# Patient Record
Sex: FEMALE | Race: WHITE | Employment: OTHER | URBAN - METROPOLITAN AREA
[De-identification: names, ages, dates, MRNs, and addresses within clinical notes are randomized per-mention and may not be internally consistent; named-entity substitution may affect disease eponyms.]

---

## 2017-02-07 ENCOUNTER — HOSPITAL ENCOUNTER (OUTPATIENT)
Dept: GENERAL RADIOLOGY | Age: 72
Discharge: HOME | End: 2017-02-07
Attending: INTERNAL MEDICINE | Admitting: INTERNAL MEDICINE

## 2017-02-07 LAB
ALBUMIN SERPL-MCNC: 4.1 GM/DL (ref 3.4–5)
ANION GAP SERPL CALCULATED.3IONS-SCNC: 15 MMOL/L (ref 4–16)
BUN BLDV-MCNC: 28 MG/DL (ref 6–23)
CALCIUM SERPL-MCNC: 9.4 MG/DL (ref 8.3–10.6)
CHLORIDE BLD-SCNC: 101 MMOL/L (ref 99–110)
CO2: 24 MMOL/L (ref 21–32)
CREAT SERPL-MCNC: 1 MG/DL (ref 0.6–1.1)
GFR AFRICAN AMERICAN: >60 ML/MIN/1.73M2
GFR NON-AFRICAN AMERICAN: 55 ML/MIN/1.73M2
GLUCOSE BLD-MCNC: 70 MG/DL (ref 70–140)
Lab: 24 HRS
MAGNESIUM: 1.5 MG/DL (ref 1.8–2.4)
PHOSPHORUS: 3.5 MG/DL (ref 2.5–4.9)
POTASSIUM SERPL-SCNC: 4.2 MMOL/L (ref 3.5–5.1)
PROTEIN 24 HOUR URINE: 92 MG/24 HR
SODIUM BLD-SCNC: 140 MMOL/L (ref 135–145)
VOLUME, (UVOL): 1050 MLS

## 2017-02-09 ENCOUNTER — HOSPITAL ENCOUNTER (OUTPATIENT)
Dept: ULTRASOUND IMAGING | Age: 72
Discharge: HOME | End: 2017-02-09
Attending: OBSTETRICS & GYNECOLOGY | Admitting: OBSTETRICS & GYNECOLOGY

## 2017-02-09 DIAGNOSIS — Z09 FOLLOW-UP EXAM: ICD-10-CM

## 2017-02-09 DIAGNOSIS — R92.8 ABNORMAL MAMMOGRAM: ICD-10-CM

## 2017-05-24 ENCOUNTER — HOSPITAL ENCOUNTER (OUTPATIENT)
Dept: GENERAL RADIOLOGY | Age: 72
Discharge: HOME | End: 2017-05-24
Attending: INTERNAL MEDICINE | Admitting: INTERNAL MEDICINE

## 2017-05-24 DIAGNOSIS — M54.5 LOW BACK PAIN, UNSPECIFIED BACK PAIN LATERALITY, UNSPECIFIED CHRONICITY, WITH SCIATICA PRESENCE UNSPECIFIED: ICD-10-CM

## 2017-08-16 ENCOUNTER — HOSPITAL ENCOUNTER (OUTPATIENT)
Dept: MRI IMAGING | Age: 72
Discharge: HOME | End: 2017-08-16
Attending: INTERNAL MEDICINE | Admitting: INTERNAL MEDICINE

## 2017-08-16 DIAGNOSIS — M54.17 LUMBOSACRAL RADICULOPATHY: ICD-10-CM

## 2017-08-16 DIAGNOSIS — M54.17 RADICULOPATHY OF LUMBOSACRAL REGION: ICD-10-CM

## 2018-09-15 ENCOUNTER — HOSPITAL ENCOUNTER (OUTPATIENT)
Dept: MRI IMAGING | Age: 73
Discharge: HOME | End: 2018-09-15
Attending: INTERNAL MEDICINE | Admitting: INTERNAL MEDICINE

## 2018-09-15 DIAGNOSIS — M54.12 RADICULOPATHY, CERVICAL: ICD-10-CM

## 2018-09-19 ENCOUNTER — HOSPITAL ENCOUNTER (OUTPATIENT)
Dept: ULTRASOUND IMAGING | Age: 73
Discharge: HOME | End: 2018-09-19
Attending: INTERNAL MEDICINE | Admitting: INTERNAL MEDICINE

## 2018-09-19 DIAGNOSIS — E04.1 THYROID NODULE: ICD-10-CM

## 2018-09-19 DIAGNOSIS — M54.2 CERVICALGIA: ICD-10-CM

## 2018-09-28 ENCOUNTER — HOSPITAL ENCOUNTER (OUTPATIENT)
Dept: GENERAL RADIOLOGY | Age: 73
Discharge: HOME | End: 2018-09-28
Payer: MEDICARE

## 2018-09-28 ENCOUNTER — HOSPITAL ENCOUNTER (OUTPATIENT)
Age: 73
Discharge: HOME | End: 2018-09-28
Payer: MEDICARE

## 2018-09-28 DIAGNOSIS — Z01.818 PRE-PROCEDURAL EXAMINATION: ICD-10-CM

## 2018-09-28 PROCEDURE — 71046 X-RAY EXAM CHEST 2 VIEWS: CPT

## 2018-12-06 PROBLEM — I65.23 BILATERAL CAROTID ARTERY STENOSIS: Status: ACTIVE | Noted: 2018-12-06

## 2018-12-17 ENCOUNTER — HOSPITAL ENCOUNTER (OUTPATIENT)
Dept: GENERAL RADIOLOGY | Age: 73
Discharge: HOME | End: 2018-12-17
Payer: MEDICARE

## 2018-12-17 ENCOUNTER — HOSPITAL ENCOUNTER (OUTPATIENT)
Dept: PREADMISSION TESTING | Age: 73
Discharge: HOME | End: 2018-12-21
Payer: MEDICARE

## 2018-12-17 VITALS
DIASTOLIC BLOOD PRESSURE: 67 MMHG | HEART RATE: 61 BPM | TEMPERATURE: 97.1 F | SYSTOLIC BLOOD PRESSURE: 144 MMHG | WEIGHT: 192 LBS | HEIGHT: 61 IN | RESPIRATION RATE: 16 BRPM | BODY MASS INDEX: 36.25 KG/M2 | OXYGEN SATURATION: 97 %

## 2018-12-17 DIAGNOSIS — Z41.9 ELECTIVE SURGERY: ICD-10-CM

## 2018-12-17 DIAGNOSIS — Z41.9 ELECTIVE SURGERY: Primary | ICD-10-CM

## 2018-12-17 LAB
ANION GAP SERPL CALCULATED.3IONS-SCNC: 15 MMOL/L (ref 4–16)
APTT: 35.2 SECONDS (ref 21.2–33)
BUN BLDV-MCNC: 23 MG/DL (ref 6–23)
CALCIUM SERPL-MCNC: 9.4 MG/DL (ref 8.3–10.6)
CHLORIDE BLD-SCNC: 104 MMOL/L (ref 99–110)
CO2: 19 MMOL/L (ref 21–32)
CREAT SERPL-MCNC: 1.5 MG/DL (ref 0.6–1.1)
GFR AFRICAN AMERICAN: 41 ML/MIN/1.73M2
GFR NON-AFRICAN AMERICAN: 34 ML/MIN/1.73M2
GLUCOSE BLD-MCNC: 88 MG/DL (ref 70–99)
HCT VFR BLD CALC: 39.5 % (ref 37–47)
HEMOGLOBIN: 12.3 GM/DL (ref 12.5–16)
INR BLD: 1.01 INDEX
MCH RBC QN AUTO: 33.7 PG (ref 27–31)
MCHC RBC AUTO-ENTMCNC: 31.1 % (ref 32–36)
MCV RBC AUTO: 108.2 FL (ref 78–100)
PDW BLD-RTO: 12.7 % (ref 11.7–14.9)
PLATELET # BLD: 305 K/CU MM (ref 140–440)
PMV BLD AUTO: 10.9 FL (ref 7.5–11.1)
POTASSIUM SERPL-SCNC: 5.2 MMOL/L (ref 3.5–5.1)
PROTHROMBIN TIME: 11.5 SECONDS (ref 9.12–12.5)
RBC # BLD: 3.65 M/CU MM (ref 4.2–5.4)
SODIUM BLD-SCNC: 138 MMOL/L (ref 135–145)
WBC # BLD: 7.2 K/CU MM (ref 4–10.5)

## 2018-12-17 PROCEDURE — 85027 COMPLETE CBC AUTOMATED: CPT

## 2018-12-17 PROCEDURE — 36415 COLL VENOUS BLD VENIPUNCTURE: CPT

## 2018-12-17 PROCEDURE — 93005 ELECTROCARDIOGRAM TRACING: CPT

## 2018-12-17 PROCEDURE — 85610 PROTHROMBIN TIME: CPT

## 2018-12-17 PROCEDURE — 86901 BLOOD TYPING SEROLOGIC RH(D): CPT

## 2018-12-17 PROCEDURE — 80048 BASIC METABOLIC PNL TOTAL CA: CPT

## 2018-12-17 PROCEDURE — 86900 BLOOD TYPING SEROLOGIC ABO: CPT

## 2018-12-17 PROCEDURE — 85730 THROMBOPLASTIN TIME PARTIAL: CPT

## 2018-12-17 PROCEDURE — 86850 RBC ANTIBODY SCREEN: CPT

## 2018-12-17 PROCEDURE — 71046 X-RAY EXAM CHEST 2 VIEWS: CPT

## 2018-12-17 RX ORDER — CEFAZOLIN SODIUM 2 G/100ML
2 INJECTION, SOLUTION INTRAVENOUS ONCE
Status: CANCELLED | OUTPATIENT
Start: 2018-12-21

## 2018-12-18 LAB
EKG ATRIAL RATE: 57 BPM
EKG DIAGNOSIS: NORMAL
EKG P AXIS: 31 DEGREES
EKG P-R INTERVAL: 188 MS
EKG Q-T INTERVAL: 472 MS
EKG QRS DURATION: 98 MS
EKG QTC CALCULATION (BAZETT): 459 MS
EKG R AXIS: -19 DEGREES
EKG T AXIS: 9 DEGREES
EKG VENTRICULAR RATE: 57 BPM

## 2018-12-19 ENCOUNTER — ANESTHESIA EVENT (OUTPATIENT)
Dept: OPERATING ROOM | Age: 73
DRG: 039 | End: 2018-12-19
Payer: MEDICARE

## 2018-12-21 ENCOUNTER — HOSPITAL ENCOUNTER (INPATIENT)
Age: 73
LOS: 1 days | Discharge: HOME | DRG: 039 | End: 2018-12-22
Attending: SURGERY | Admitting: SURGERY
Payer: MEDICARE

## 2018-12-21 ENCOUNTER — ANESTHESIA (OUTPATIENT)
Dept: OPERATING ROOM | Age: 73
DRG: 039 | End: 2018-12-21
Payer: MEDICARE

## 2018-12-21 VITALS
OXYGEN SATURATION: 97 % | RESPIRATION RATE: 10 BRPM | DIASTOLIC BLOOD PRESSURE: 91 MMHG | SYSTOLIC BLOOD PRESSURE: 192 MMHG | TEMPERATURE: 97.7 F

## 2018-12-21 PROBLEM — I65.21 CAROTID STENOSIS, RIGHT: Status: ACTIVE | Noted: 2018-12-21

## 2018-12-21 PROCEDURE — 2580000003 HC RX 258: Performed by: PHYSICIAN ASSISTANT

## 2018-12-21 PROCEDURE — 3700000001 HC ADD 15 MINUTES (ANESTHESIA): Performed by: SURGERY

## 2018-12-21 PROCEDURE — 2100000000 HC CCU R&B

## 2018-12-21 PROCEDURE — C9248 INJ, CLEVIDIPINE BUTYRATE: HCPCS | Performed by: ANESTHESIOLOGY

## 2018-12-21 PROCEDURE — 94150 VITAL CAPACITY TEST: CPT

## 2018-12-21 PROCEDURE — 2580000003 HC RX 258: Performed by: NURSE ANESTHETIST, CERTIFIED REGISTERED

## 2018-12-21 PROCEDURE — 2500000003 HC RX 250 WO HCPCS: Performed by: NURSE ANESTHETIST, CERTIFIED REGISTERED

## 2018-12-21 PROCEDURE — 6370000000 HC RX 637 (ALT 250 FOR IP): Performed by: SURGERY

## 2018-12-21 PROCEDURE — 03CH0ZZ EXTIRPATION OF MATTER FROM RIGHT COMMON CAROTID ARTERY, OPEN APPROACH: ICD-10-PCS | Performed by: SURGERY

## 2018-12-21 PROCEDURE — 6360000002 HC RX W HCPCS: Performed by: NURSE ANESTHETIST, CERTIFIED REGISTERED

## 2018-12-21 PROCEDURE — 2709999900 HC NON-CHARGEABLE SUPPLY: Performed by: SURGERY

## 2018-12-21 PROCEDURE — 6360000002 HC RX W HCPCS: Performed by: ANESTHESIOLOGY

## 2018-12-21 PROCEDURE — 03UH0KZ SUPPLEMENT RIGHT COMMON CAROTID ARTERY WITH NONAUTOLOGOUS TISSUE SUBSTITUTE, OPEN APPROACH: ICD-10-PCS | Performed by: SURGERY

## 2018-12-21 PROCEDURE — 2700000000 HC OXYGEN THERAPY PER DAY

## 2018-12-21 PROCEDURE — 2780000010 HC IMPLANT OTHER: Performed by: SURGERY

## 2018-12-21 PROCEDURE — 7100000001 HC PACU RECOVERY - ADDTL 15 MIN: Performed by: SURGERY

## 2018-12-21 PROCEDURE — 03CM0ZZ EXTIRPATION OF MATTER FROM RIGHT EXTERNAL CAROTID ARTERY, OPEN APPROACH: ICD-10-PCS | Performed by: SURGERY

## 2018-12-21 PROCEDURE — 03UM0KZ SUPPLEMENT RIGHT EXTERNAL CAROTID ARTERY WITH NONAUTOLOGOUS TISSUE SUBSTITUTE, OPEN APPROACH: ICD-10-PCS | Performed by: SURGERY

## 2018-12-21 PROCEDURE — 6370000000 HC RX 637 (ALT 250 FOR IP): Performed by: PHYSICIAN ASSISTANT

## 2018-12-21 PROCEDURE — 3600000014 HC SURGERY LEVEL 4 ADDTL 15MIN: Performed by: SURGERY

## 2018-12-21 PROCEDURE — C1768 GRAFT, VASCULAR: HCPCS | Performed by: SURGERY

## 2018-12-21 PROCEDURE — 3600000004 HC SURGERY LEVEL 4 BASE: Performed by: SURGERY

## 2018-12-21 PROCEDURE — 94664 DEMO&/EVAL PT USE INHALER: CPT

## 2018-12-21 PROCEDURE — 94761 N-INVAS EAR/PLS OXIMETRY MLT: CPT

## 2018-12-21 PROCEDURE — 03UK0KZ SUPPLEMENT RIGHT INTERNAL CAROTID ARTERY WITH NONAUTOLOGOUS TISSUE SUBSTITUTE, OPEN APPROACH: ICD-10-PCS | Performed by: SURGERY

## 2018-12-21 PROCEDURE — 6360000002 HC RX W HCPCS: Performed by: PHYSICIAN ASSISTANT

## 2018-12-21 PROCEDURE — 03CK0ZZ EXTIRPATION OF MATTER FROM RIGHT INTERNAL CAROTID ARTERY, OPEN APPROACH: ICD-10-PCS | Performed by: SURGERY

## 2018-12-21 PROCEDURE — 3700000000 HC ANESTHESIA ATTENDED CARE: Performed by: SURGERY

## 2018-12-21 PROCEDURE — 2580000003 HC RX 258: Performed by: ANESTHESIOLOGY

## 2018-12-21 PROCEDURE — 2500000003 HC RX 250 WO HCPCS: Performed by: PHYSICIAN ASSISTANT

## 2018-12-21 PROCEDURE — 6360000002 HC RX W HCPCS: Performed by: SURGERY

## 2018-12-21 PROCEDURE — 88300 SURGICAL PATH GROSS: CPT

## 2018-12-21 PROCEDURE — 6370000000 HC RX 637 (ALT 250 FOR IP)

## 2018-12-21 PROCEDURE — 7100000000 HC PACU RECOVERY - FIRST 15 MIN: Performed by: SURGERY

## 2018-12-21 DEVICE — GRAFT VASC W1XL10CM THK6 PLY SYNTH CROSSLINKED ELAS FOR VASC: Type: IMPLANTABLE DEVICE | Site: CAROTID | Status: FUNCTIONAL

## 2018-12-21 DEVICE — AGENT HEMOSTATIC SURGIFLOW MATRIX KIT W/THROMBIN: Type: IMPLANTABLE DEVICE | Site: CAROTID | Status: FUNCTIONAL

## 2018-12-21 RX ORDER — PROTAMINE SULFATE 10 MG/ML
INJECTION, SOLUTION INTRAVENOUS PRN
Status: DISCONTINUED | OUTPATIENT
Start: 2018-12-21 | End: 2018-12-21 | Stop reason: SDUPTHER

## 2018-12-21 RX ORDER — AMLODIPINE BESYLATE 10 MG/1
10 TABLET ORAL NIGHTLY
Status: DISCONTINUED | OUTPATIENT
Start: 2018-12-21 | End: 2018-12-21

## 2018-12-21 RX ORDER — LABETALOL HYDROCHLORIDE 5 MG/ML
10 INJECTION, SOLUTION INTRAVENOUS
Status: DISCONTINUED | OUTPATIENT
Start: 2018-12-21 | End: 2018-12-22 | Stop reason: HOSPADM

## 2018-12-21 RX ORDER — SODIUM CHLORIDE, SODIUM LACTATE, POTASSIUM CHLORIDE, CALCIUM CHLORIDE 600; 310; 30; 20 MG/100ML; MG/100ML; MG/100ML; MG/100ML
INJECTION, SOLUTION INTRAVENOUS CONTINUOUS PRN
Status: DISCONTINUED | OUTPATIENT
Start: 2018-12-21 | End: 2018-12-21 | Stop reason: SDUPTHER

## 2018-12-21 RX ORDER — ATORVASTATIN CALCIUM 40 MG/1
80 TABLET, FILM COATED ORAL NIGHTLY
Status: DISCONTINUED | OUTPATIENT
Start: 2018-12-21 | End: 2018-12-22 | Stop reason: HOSPADM

## 2018-12-21 RX ORDER — ROCURONIUM BROMIDE 10 MG/ML
INJECTION, SOLUTION INTRAVENOUS PRN
Status: DISCONTINUED | OUTPATIENT
Start: 2018-12-21 | End: 2018-12-21 | Stop reason: SDUPTHER

## 2018-12-21 RX ORDER — MORPHINE SULFATE 4 MG/ML
2 INJECTION, SOLUTION INTRAMUSCULAR; INTRAVENOUS
Status: DISCONTINUED | OUTPATIENT
Start: 2018-12-21 | End: 2018-12-22 | Stop reason: HOSPADM

## 2018-12-21 RX ORDER — PANTOPRAZOLE SODIUM 40 MG/1
40 TABLET, DELAYED RELEASE ORAL
Status: DISCONTINUED | OUTPATIENT
Start: 2018-12-21 | End: 2018-12-22 | Stop reason: HOSPADM

## 2018-12-21 RX ORDER — CALCIUM CARBONATE 500(1250)
500 TABLET ORAL DAILY
Status: DISCONTINUED | OUTPATIENT
Start: 2018-12-21 | End: 2018-12-22 | Stop reason: HOSPADM

## 2018-12-21 RX ORDER — METOPROLOL SUCCINATE 50 MG/1
50 TABLET, EXTENDED RELEASE ORAL NIGHTLY
Status: DISCONTINUED | OUTPATIENT
Start: 2018-12-21 | End: 2018-12-21

## 2018-12-21 RX ORDER — HYDROMORPHONE HCL 110MG/55ML
0.5 PATIENT CONTROLLED ANALGESIA SYRINGE INTRAVENOUS EVERY 5 MIN PRN
Status: DISCONTINUED | OUTPATIENT
Start: 2018-12-21 | End: 2018-12-21 | Stop reason: HOSPADM

## 2018-12-21 RX ORDER — ONDANSETRON 2 MG/ML
4 INJECTION INTRAMUSCULAR; INTRAVENOUS EVERY 6 HOURS PRN
Status: DISCONTINUED | OUTPATIENT
Start: 2018-12-21 | End: 2018-12-22 | Stop reason: HOSPADM

## 2018-12-21 RX ORDER — MEPERIDINE HYDROCHLORIDE 25 MG/ML
12.5 INJECTION INTRAMUSCULAR; INTRAVENOUS; SUBCUTANEOUS EVERY 5 MIN PRN
Status: DISCONTINUED | OUTPATIENT
Start: 2018-12-21 | End: 2018-12-21 | Stop reason: HOSPADM

## 2018-12-21 RX ORDER — HYDROCODONE BITARTRATE AND ACETAMINOPHEN 5; 325 MG/1; MG/1
1 TABLET ORAL EVERY 4 HOURS PRN
Status: DISCONTINUED | OUTPATIENT
Start: 2018-12-21 | End: 2018-12-22 | Stop reason: HOSPADM

## 2018-12-21 RX ORDER — SODIUM CHLORIDE, SODIUM LACTATE, POTASSIUM CHLORIDE, CALCIUM CHLORIDE 600; 310; 30; 20 MG/100ML; MG/100ML; MG/100ML; MG/100ML
INJECTION, SOLUTION INTRAVENOUS ONCE
Status: COMPLETED | OUTPATIENT
Start: 2018-12-21 | End: 2018-12-21

## 2018-12-21 RX ORDER — VECURONIUM BROMIDE 1 MG/ML
INJECTION, POWDER, LYOPHILIZED, FOR SOLUTION INTRAVENOUS PRN
Status: DISCONTINUED | OUTPATIENT
Start: 2018-12-21 | End: 2018-12-21 | Stop reason: SDUPTHER

## 2018-12-21 RX ORDER — ACETAMINOPHEN 325 MG/1
650 TABLET ORAL EVERY 4 HOURS PRN
Status: DISCONTINUED | OUTPATIENT
Start: 2018-12-21 | End: 2018-12-22 | Stop reason: HOSPADM

## 2018-12-21 RX ORDER — FENTANYL CITRATE 50 UG/ML
INJECTION, SOLUTION INTRAMUSCULAR; INTRAVENOUS PRN
Status: DISCONTINUED | OUTPATIENT
Start: 2018-12-21 | End: 2018-12-21 | Stop reason: SDUPTHER

## 2018-12-21 RX ORDER — SODIUM CHLORIDE 0.9 % (FLUSH) 0.9 %
10 SYRINGE (ML) INJECTION EVERY 12 HOURS SCHEDULED
Status: DISCONTINUED | OUTPATIENT
Start: 2018-12-21 | End: 2018-12-22 | Stop reason: HOSPADM

## 2018-12-21 RX ORDER — AMLODIPINE BESYLATE 10 MG/1
10 TABLET ORAL NIGHTLY
Status: DISCONTINUED | OUTPATIENT
Start: 2018-12-21 | End: 2018-12-22 | Stop reason: HOSPADM

## 2018-12-21 RX ORDER — GLYCOPYRROLATE 0.2 MG/ML
INJECTION INTRAMUSCULAR; INTRAVENOUS PRN
Status: DISCONTINUED | OUTPATIENT
Start: 2018-12-21 | End: 2018-12-21 | Stop reason: SDUPTHER

## 2018-12-21 RX ORDER — FENTANYL CITRATE 50 UG/ML
50 INJECTION, SOLUTION INTRAMUSCULAR; INTRAVENOUS EVERY 5 MIN PRN
Status: DISCONTINUED | OUTPATIENT
Start: 2018-12-21 | End: 2018-12-21 | Stop reason: HOSPADM

## 2018-12-21 RX ORDER — PROPOFOL 10 MG/ML
INJECTION, EMULSION INTRAVENOUS PRN
Status: DISCONTINUED | OUTPATIENT
Start: 2018-12-21 | End: 2018-12-21 | Stop reason: SDUPTHER

## 2018-12-21 RX ORDER — CEFAZOLIN SODIUM 2 G/100ML
2 INJECTION, SOLUTION INTRAVENOUS ONCE
Status: COMPLETED | OUTPATIENT
Start: 2018-12-21 | End: 2018-12-21

## 2018-12-21 RX ORDER — HYDROXYCHLOROQUINE SULFATE 200 MG/1
200 TABLET, FILM COATED ORAL 2 TIMES DAILY
Status: DISCONTINUED | OUTPATIENT
Start: 2018-12-21 | End: 2018-12-22 | Stop reason: HOSPADM

## 2018-12-21 RX ORDER — SODIUM CHLORIDE 450 MG/100ML
INJECTION, SOLUTION INTRAVENOUS CONTINUOUS
Status: DISCONTINUED | OUTPATIENT
Start: 2018-12-21 | End: 2018-12-22 | Stop reason: HOSPADM

## 2018-12-21 RX ORDER — LABETALOL HYDROCHLORIDE 5 MG/ML
5 INJECTION, SOLUTION INTRAVENOUS EVERY 10 MIN PRN
Status: DISCONTINUED | OUTPATIENT
Start: 2018-12-21 | End: 2018-12-21 | Stop reason: HOSPADM

## 2018-12-21 RX ORDER — METOPROLOL SUCCINATE 50 MG/1
50 TABLET, EXTENDED RELEASE ORAL NIGHTLY
Status: DISCONTINUED | OUTPATIENT
Start: 2018-12-21 | End: 2018-12-22 | Stop reason: HOSPADM

## 2018-12-21 RX ORDER — SODIUM CHLORIDE 0.9 % (FLUSH) 0.9 %
10 SYRINGE (ML) INJECTION PRN
Status: DISCONTINUED | OUTPATIENT
Start: 2018-12-21 | End: 2018-12-22 | Stop reason: HOSPADM

## 2018-12-21 RX ORDER — PROMETHAZINE HYDROCHLORIDE 25 MG/ML
6.25 INJECTION, SOLUTION INTRAMUSCULAR; INTRAVENOUS
Status: DISCONTINUED | OUTPATIENT
Start: 2018-12-21 | End: 2018-12-21 | Stop reason: HOSPADM

## 2018-12-21 RX ORDER — HEPARIN SODIUM 1000 [USP'U]/ML
INJECTION, SOLUTION INTRAVENOUS; SUBCUTANEOUS PRN
Status: DISCONTINUED | OUTPATIENT
Start: 2018-12-21 | End: 2018-12-21 | Stop reason: SDUPTHER

## 2018-12-21 RX ORDER — DEXAMETHASONE SODIUM PHOSPHATE 4 MG/ML
INJECTION, SOLUTION INTRA-ARTICULAR; INTRALESIONAL; INTRAMUSCULAR; INTRAVENOUS; SOFT TISSUE PRN
Status: DISCONTINUED | OUTPATIENT
Start: 2018-12-21 | End: 2018-12-21 | Stop reason: SDUPTHER

## 2018-12-21 RX ORDER — FOLIC ACID 1 MG/1
1 TABLET ORAL DAILY
Status: DISCONTINUED | OUTPATIENT
Start: 2018-12-21 | End: 2018-12-22 | Stop reason: HOSPADM

## 2018-12-21 RX ORDER — ALBUTEROL SULFATE 2.5 MG/3ML
0.63 SOLUTION RESPIRATORY (INHALATION) 3 TIMES DAILY PRN
Status: DISCONTINUED | OUTPATIENT
Start: 2018-12-21 | End: 2018-12-22 | Stop reason: HOSPADM

## 2018-12-21 RX ORDER — SODIUM CHLORIDE 9 MG/ML
INJECTION, SOLUTION INTRAVENOUS CONTINUOUS PRN
Status: DISCONTINUED | OUTPATIENT
Start: 2018-12-21 | End: 2018-12-21 | Stop reason: SDUPTHER

## 2018-12-21 RX ORDER — CEFAZOLIN SODIUM 2 G/50ML
2 SOLUTION INTRAVENOUS EVERY 8 HOURS
Status: COMPLETED | OUTPATIENT
Start: 2018-12-21 | End: 2018-12-22

## 2018-12-21 RX ORDER — ASPIRIN 81 MG/1
81 TABLET ORAL DAILY
Status: DISCONTINUED | OUTPATIENT
Start: 2018-12-21 | End: 2018-12-22 | Stop reason: HOSPADM

## 2018-12-21 RX ORDER — DIPHENHYDRAMINE HYDROCHLORIDE 50 MG/ML
12.5 INJECTION INTRAMUSCULAR; INTRAVENOUS
Status: DISCONTINUED | OUTPATIENT
Start: 2018-12-21 | End: 2018-12-21 | Stop reason: HOSPADM

## 2018-12-21 RX ORDER — HYDRALAZINE HYDROCHLORIDE 20 MG/ML
5 INJECTION INTRAMUSCULAR; INTRAVENOUS EVERY 10 MIN PRN
Status: DISCONTINUED | OUTPATIENT
Start: 2018-12-21 | End: 2018-12-21 | Stop reason: HOSPADM

## 2018-12-21 RX ADMIN — SODIUM CHLORIDE, PRESERVATIVE FREE 10 ML: 5 INJECTION INTRAVENOUS at 20:57

## 2018-12-21 RX ADMIN — AMLODIPINE BESYLATE 10 MG: 10 TABLET ORAL at 17:36

## 2018-12-21 RX ADMIN — GLYCOPYRROLATE 0.2 MG: 0.2 INJECTION, SOLUTION INTRAMUSCULAR; INTRAVENOUS at 09:52

## 2018-12-21 RX ADMIN — VECURONIUM BROMIDE FOR INJECTION 3 MG: 1 INJECTION, POWDER, LYOPHILIZED, FOR SOLUTION INTRAVENOUS at 09:41

## 2018-12-21 RX ADMIN — HYDROMORPHONE HYDROCHLORIDE 0.5 MG: 2 INJECTION INTRAMUSCULAR; INTRAVENOUS; SUBCUTANEOUS at 12:12

## 2018-12-21 RX ADMIN — PANTOPRAZOLE SODIUM 40 MG: 40 TABLET, DELAYED RELEASE ORAL at 15:36

## 2018-12-21 RX ADMIN — HYDROCODONE BITARTRATE AND ACETAMINOPHEN 1 TABLET: 5; 325 TABLET ORAL at 15:36

## 2018-12-21 RX ADMIN — METOPROLOL SUCCINATE 50 MG: 50 TABLET, EXTENDED RELEASE ORAL at 17:36

## 2018-12-21 RX ADMIN — SODIUM CHLORIDE: 4.5 INJECTION, SOLUTION INTRAVENOUS at 11:46

## 2018-12-21 RX ADMIN — SERTRALINE HYDROCHLORIDE 25 MG: 50 TABLET ORAL at 21:00

## 2018-12-21 RX ADMIN — PROTAMINE SULFATE 50 MG: 10 INJECTION, SOLUTION INTRAVENOUS at 10:31

## 2018-12-21 RX ADMIN — HYDROXYCHLOROQUINE SULFATE 200 MG: 200 TABLET, FILM COATED ENTERAL at 20:57

## 2018-12-21 RX ADMIN — MORPHINE SULFATE 2 MG: 4 INJECTION INTRAVENOUS at 19:44

## 2018-12-21 RX ADMIN — CALCIUM 500 MG: 500 TABLET ORAL at 15:36

## 2018-12-21 RX ADMIN — ROCURONIUM BROMIDE 35 MG: 50 INJECTION, SOLUTION INTRAVENOUS at 09:29

## 2018-12-21 RX ADMIN — DEXAMETHASONE SODIUM PHOSPHATE 4 MG: 4 INJECTION, SOLUTION INTRAMUSCULAR; INTRAVENOUS at 09:44

## 2018-12-21 RX ADMIN — FOLIC ACID 1 MG: 1 TABLET ORAL at 15:36

## 2018-12-21 RX ADMIN — CLEVIPIDINE 2 MG/HR: 0.5 EMULSION INTRAVENOUS at 10:33

## 2018-12-21 RX ADMIN — ASPIRIN 81 MG: 81 TABLET, COATED ORAL at 15:36

## 2018-12-21 RX ADMIN — CLEVIPIDINE 5 MG/HR: 0.5 EMULSION INTRAVENOUS at 12:17

## 2018-12-21 RX ADMIN — ATORVASTATIN CALCIUM 80 MG: 40 TABLET, FILM COATED ORAL at 20:57

## 2018-12-21 RX ADMIN — PROPOFOL 150 MG: 10 INJECTION, EMULSION INTRAVENOUS at 09:29

## 2018-12-21 RX ADMIN — SODIUM CHLORIDE, PRESERVATIVE FREE 10 ML: 5 INJECTION INTRAVENOUS at 20:58

## 2018-12-21 RX ADMIN — CEFAZOLIN SODIUM 2 G: 2 INJECTION, SOLUTION INTRAVENOUS at 09:38

## 2018-12-21 RX ADMIN — SODIUM CHLORIDE: 9 INJECTION, SOLUTION INTRAVENOUS at 09:45

## 2018-12-21 RX ADMIN — SODIUM CHLORIDE, POTASSIUM CHLORIDE, SODIUM LACTATE AND CALCIUM CHLORIDE: 600; 310; 30; 20 INJECTION, SOLUTION INTRAVENOUS at 08:25

## 2018-12-21 RX ADMIN — FENTANYL CITRATE 100 MCG: 50 INJECTION INTRAMUSCULAR; INTRAVENOUS at 09:51

## 2018-12-21 RX ADMIN — Medication 2000 UNITS: at 15:36

## 2018-12-21 RX ADMIN — HYDROCODONE BITARTRATE AND ACETAMINOPHEN 1 TABLET: 5; 325 TABLET ORAL at 23:28

## 2018-12-21 RX ADMIN — Medication 400 MG: at 15:36

## 2018-12-21 RX ADMIN — HEPARIN SODIUM 5000 UNITS: 1000 INJECTION, SOLUTION INTRAVENOUS; SUBCUTANEOUS at 10:08

## 2018-12-21 RX ADMIN — LABETALOL HYDROCHLORIDE 10 MG: 5 INJECTION, SOLUTION INTRAVENOUS at 19:44

## 2018-12-21 RX ADMIN — FENTANYL CITRATE 100 MCG: 50 INJECTION INTRAMUSCULAR; INTRAVENOUS at 09:28

## 2018-12-21 RX ADMIN — CEFAZOLIN SODIUM 2 G: 2 INJECTION, SOLUTION INTRAVENOUS at 17:30

## 2018-12-21 RX ADMIN — VECURONIUM BROMIDE FOR INJECTION 2 MG: 1 INJECTION, POWDER, LYOPHILIZED, FOR SOLUTION INTRAVENOUS at 10:16

## 2018-12-21 RX ADMIN — SODIUM CHLORIDE, POTASSIUM CHLORIDE, SODIUM LACTATE AND CALCIUM CHLORIDE: 600; 310; 30; 20 INJECTION, SOLUTION INTRAVENOUS at 08:35

## 2018-12-21 ASSESSMENT — PAIN SCALES - GENERAL
PAINLEVEL_OUTOF10: 5
PAINLEVEL_OUTOF10: 2
PAINLEVEL_OUTOF10: 3
PAINLEVEL_OUTOF10: 2
PAINLEVEL_OUTOF10: 3
PAINLEVEL_OUTOF10: 7
PAINLEVEL_OUTOF10: 7
PAINLEVEL_OUTOF10: 3
PAINLEVEL_OUTOF10: 7
PAINLEVEL_OUTOF10: 3

## 2018-12-21 ASSESSMENT — PULMONARY FUNCTION TESTS
PIF_VALUE: 17
PIF_VALUE: 18
PIF_VALUE: 17
PIF_VALUE: 18
PIF_VALUE: 16
PIF_VALUE: 0
PIF_VALUE: 17
PIF_VALUE: 3
PIF_VALUE: 16
PIF_VALUE: 17
PIF_VALUE: 1
PIF_VALUE: 17
PIF_VALUE: 18
PIF_VALUE: 18
PIF_VALUE: 17
PIF_VALUE: 16
PIF_VALUE: 17
PIF_VALUE: 17
PIF_VALUE: 18
PIF_VALUE: 17
PIF_VALUE: 18
PIF_VALUE: 0
PIF_VALUE: 5
PIF_VALUE: 17
PIF_VALUE: 1
PIF_VALUE: 17
PIF_VALUE: 2
PIF_VALUE: 1
PIF_VALUE: 17
PIF_VALUE: 0
PIF_VALUE: 17
PIF_VALUE: 16
PIF_VALUE: 17
PIF_VALUE: 18
PIF_VALUE: 18
PIF_VALUE: 16
PIF_VALUE: 16
PIF_VALUE: 17
PIF_VALUE: 16
PIF_VALUE: 17
PIF_VALUE: 18
PIF_VALUE: 1
PIF_VALUE: 2
PIF_VALUE: 19
PIF_VALUE: 17
PIF_VALUE: 19
PIF_VALUE: 2
PIF_VALUE: 17
PIF_VALUE: 17
PIF_VALUE: 0
PIF_VALUE: 17
PIF_VALUE: 1
PIF_VALUE: 17
PIF_VALUE: 17
PIF_VALUE: 11
PIF_VALUE: 17
PIF_VALUE: 9
PIF_VALUE: 17
PIF_VALUE: 17
PIF_VALUE: 3
PIF_VALUE: 3
PIF_VALUE: 0
PIF_VALUE: 23
PIF_VALUE: 2
PIF_VALUE: 11
PIF_VALUE: 17
PIF_VALUE: 17
PIF_VALUE: 2
PIF_VALUE: 17
PIF_VALUE: 18
PIF_VALUE: 18
PIF_VALUE: 4
PIF_VALUE: 16
PIF_VALUE: 17
PIF_VALUE: 17
PIF_VALUE: 16
PIF_VALUE: 17
PIF_VALUE: 3
PIF_VALUE: 17
PIF_VALUE: 17
PIF_VALUE: 1
PIF_VALUE: 17
PIF_VALUE: 15
PIF_VALUE: 37
PIF_VALUE: 17
PIF_VALUE: 16
PIF_VALUE: 17
PIF_VALUE: 18
PIF_VALUE: 0
PIF_VALUE: 17
PIF_VALUE: 17
PIF_VALUE: 22
PIF_VALUE: 0
PIF_VALUE: 18

## 2018-12-21 ASSESSMENT — PAIN DESCRIPTION - FREQUENCY
FREQUENCY: INTERMITTENT
FREQUENCY: INTERMITTENT

## 2018-12-21 ASSESSMENT — PAIN DESCRIPTION - ORIENTATION
ORIENTATION: RIGHT

## 2018-12-21 ASSESSMENT — PAIN DESCRIPTION - DESCRIPTORS
DESCRIPTORS: ACHING
DESCRIPTORS: ACHING

## 2018-12-21 ASSESSMENT — PAIN DESCRIPTION - ONSET: ONSET: GRADUAL

## 2018-12-21 ASSESSMENT — PAIN DESCRIPTION - LOCATION
LOCATION: NECK

## 2018-12-21 ASSESSMENT — PAIN DESCRIPTION - PAIN TYPE
TYPE: SURGICAL PAIN

## 2018-12-21 ASSESSMENT — PAIN - FUNCTIONAL ASSESSMENT: PAIN_FUNCTIONAL_ASSESSMENT: 0-10

## 2018-12-21 ASSESSMENT — PAIN DESCRIPTION - PROGRESSION: CLINICAL_PROGRESSION: GRADUALLY WORSENING

## 2018-12-21 NOTE — PROGRESS NOTES
Monitored patient transported to 2002. Vital signs stable; pain controlled; no nausea; awake and appropriate; neurologically intact; tolerating po ice chips. Bedside report given to [address], rn.  RN in room. Family given room number and sent to icu waiting area.

## 2018-12-21 NOTE — ANESTHESIA PRE PROCEDURE
Department of Anesthesiology  Preprocedure Note       Name:  Humberto May   Age:  68 y.o.  :  1945                                          MRN:  [id number]         Date:  2018      Surgeon: Delroy Diaz):  Zoie Carrera MD    Procedure: RIGHT CAROTID ENDARTERECTOMY (Right Neck)    Medications prior to admission:   Prior to Admission medications    Medication Sig Start Date End Date Taking?  Authorizing Provider   atorvastatin (LIPITOR) 80 MG tablet Take 80 mg by mouth nightly    Yes Historical Provider, MD   hydroxychloroquine (PLAQUENIL) 200 MG tablet Take 200 mg by mouth 2 times daily    Yes Historical Provider, MD   calcium carbonate 600 MG TABS tablet Take 1 tablet by mouth daily   Yes Historical Provider, MD   Cholecalciferol (VITAMIN D) 2000 units CAPS capsule Take 1 capsule by mouth daily   Yes Historical Provider, MD   folic acid (FOLVITE) 1 MG tablet Take 1 tablet by mouth daily 9/16/15  Yes Marc Mclaughlin MD   amLODIPine (NORVASC) 10 MG tablet Take 10 mg by mouth nightly    Yes Historical Provider, MD   metoprolol (TOPROL-XL) 50 MG XL tablet Take 50 mg by mouth nightly    Yes Historical Provider, MD   fluticasone-salmeterol (ADVAIR) 250-50 MCG/DOSE AEPB Inhale 1 puff into the lungs every 12 hours   Yes Historical Provider, MD   pantoprazole (PROTONIX) 40 MG tablet Take 40 mg by mouth every 48 hours    Yes Historical Provider, MD   sertraline (ZOLOFT) 25 MG tablet Take 25 mg by mouth daily   Yes Historical Provider, MD   aspirin 81 MG tablet Take 81 mg by mouth daily    Historical Provider, MD   magnesium oxide (MAG-OX) 400 MG tablet Take 400 mg by mouth daily    Historical Provider, MD   albuterol (PROVENTIL HFA;VENTOLIN HFA) 108 (90 BASE) MCG/ACT inhaler Inhale 2 puffs into the lungs every 6 hours as needed for Wheezing    Historical Provider, MD   levalbuterol (XOPENEX) 0.63 MG/3ML nebulization Take 1 ampule by nebulization every 8 hours as needed for Wheezing    Historical
Kendal May ([id number]) on 12/18/2018 4:29:17 AM      Neuro/Psych:   (+) psychiatric history:            GI/Hepatic/Renal:   (+) GERD:, PUD, morbid obesity          Endo/Other:    (+) : arthritis:., .                 Abdominal:           Vascular:                                        Anesthesia Plan      general     ASA 4     (Chart review )  Induction: intravenous. arterial line  MIPS: Postoperative opioids intended and Prophylactic antiemetics administered.                       MICA Limon - CRNA   12/19/2018

## 2018-12-21 NOTE — BRIEF OP NOTE
Brief Postoperative Note  ______________________________________________________________    Patient: Vilma Garrison  YOB: 1945  MRN: [id number]  Date of Procedure: 12/21/2018    Pre-Op Diagnosis: CAROTID STENOSIS    Post-Op Diagnosis: Same       Procedure(s):  RIGHT CAROTID ENDARTERECTOMY    Anesthesia: General    Surgeon(s):  Cathie Chen MD    Assistant: Alyx Pacheco PA-C      Estimated Blood Loss (mL): less than 50     Complications: None    Specimens:   ID Type Source Tests Collected by Time Destination   A : RIGHT CAROTID PLAQUE Specimen Neck SURGICAL PATHOLOGY Wiley Nieves MD 12/21/2018 1007        Implants:    Implant Name Type Inv.  Item Serial No.  Lot No. LRB No. Used   KIT SEALANT SURGIFLO HEMOSTATIC MATRIX Bone/Graft/Tissue KIT SEALANT SURGIFLO HEMOSTATIC MATRIX  [employer]: [employer]  Right 1   JEFFREY-PATCH VASC ECM CORMATRIX 1X10CM Vascular/Graft/Patch/Filter JEFFREY-PATCH VASC ECM CORMATRIX 1X10CM   [employer]   Right 1         Drains:      Findings: ulcerated plaque    Alyx Pacheco PA-C  Date: 12/21/2018  Time: 10:43 AM

## 2018-12-22 VITALS
BODY MASS INDEX: 36.63 KG/M2 | TEMPERATURE: 98.2 F | HEART RATE: 68 BPM | HEIGHT: 61 IN | OXYGEN SATURATION: 98 % | WEIGHT: 194 LBS | DIASTOLIC BLOOD PRESSURE: 70 MMHG | SYSTOLIC BLOOD PRESSURE: 140 MMHG | RESPIRATION RATE: 21 BRPM

## 2018-12-22 LAB
ANION GAP SERPL CALCULATED.3IONS-SCNC: 10 MMOL/L (ref 4–16)
BASOPHILS ABSOLUTE: 0 K/CU MM
BASOPHILS RELATIVE PERCENT: 0.4 % (ref 0–1)
BUN BLDV-MCNC: 24 MG/DL (ref 6–23)
CALCIUM SERPL-MCNC: 9 MG/DL (ref 8.3–10.6)
CHLORIDE BLD-SCNC: 107 MMOL/L (ref 99–110)
CO2: 24 MMOL/L (ref 21–32)
CREAT SERPL-MCNC: 1.3 MG/DL (ref 0.6–1.1)
DIFFERENTIAL TYPE: ABNORMAL
EOSINOPHILS ABSOLUTE: 0 K/CU MM
EOSINOPHILS RELATIVE PERCENT: 0.4 % (ref 0–3)
GFR AFRICAN AMERICAN: 49 ML/MIN/1.73M2
GFR NON-AFRICAN AMERICAN: 40 ML/MIN/1.73M2
GLUCOSE BLD-MCNC: 114 MG/DL (ref 70–99)
HCT VFR BLD CALC: 32.6 % (ref 37–47)
HEMOGLOBIN: 10.5 GM/DL (ref 12.5–16)
IMMATURE NEUTROPHIL %: 0.4 % (ref 0–0.43)
LYMPHOCYTES ABSOLUTE: 1 K/CU MM
LYMPHOCYTES RELATIVE PERCENT: 12.6 % (ref 24–44)
MCH RBC QN AUTO: 33.7 PG (ref 27–31)
MCHC RBC AUTO-ENTMCNC: 32.2 % (ref 32–36)
MCV RBC AUTO: 104.5 FL (ref 78–100)
MONOCYTES ABSOLUTE: 0.5 K/CU MM
MONOCYTES RELATIVE PERCENT: 6.6 % (ref 0–4)
NUCLEATED RBC %: 0 %
PDW BLD-RTO: 12.8 % (ref 11.7–14.9)
PLATELET # BLD: 240 K/CU MM (ref 140–440)
PMV BLD AUTO: 11.3 FL (ref 7.5–11.1)
POTASSIUM SERPL-SCNC: 4.9 MMOL/L (ref 3.5–5.1)
RBC # BLD: 3.12 M/CU MM (ref 4.2–5.4)
SEGMENTED NEUTROPHILS ABSOLUTE COUNT: 6.5 K/CU MM
SEGMENTED NEUTROPHILS RELATIVE PERCENT: 79.6 % (ref 36–66)
SODIUM BLD-SCNC: 141 MMOL/L (ref 135–145)
TOTAL IMMATURE NEUTOROPHIL: 0.03 K/CU MM
TOTAL NUCLEATED RBC: 0 K/CU MM
WBC # BLD: 8.2 K/CU MM (ref 4–10.5)

## 2018-12-22 PROCEDURE — 6370000000 HC RX 637 (ALT 250 FOR IP): Performed by: PHYSICIAN ASSISTANT

## 2018-12-22 PROCEDURE — 6370000000 HC RX 637 (ALT 250 FOR IP): Performed by: SURGERY

## 2018-12-22 PROCEDURE — 85025 COMPLETE CBC W/AUTO DIFF WBC: CPT

## 2018-12-22 PROCEDURE — 6360000002 HC RX W HCPCS: Performed by: PHYSICIAN ASSISTANT

## 2018-12-22 PROCEDURE — 2580000003 HC RX 258: Performed by: PHYSICIAN ASSISTANT

## 2018-12-22 PROCEDURE — 80048 BASIC METABOLIC PNL TOTAL CA: CPT

## 2018-12-22 RX ADMIN — HYDROXYCHLOROQUINE SULFATE 200 MG: 200 TABLET, FILM COATED ENTERAL at 09:00

## 2018-12-22 RX ADMIN — HYDROCODONE BITARTRATE AND ACETAMINOPHEN 1 TABLET: 5; 325 TABLET ORAL at 09:49

## 2018-12-22 RX ADMIN — CALCIUM 500 MG: 500 TABLET ORAL at 09:35

## 2018-12-22 RX ADMIN — ASPIRIN 81 MG: 81 TABLET, COATED ORAL at 09:35

## 2018-12-22 RX ADMIN — HYDROCODONE BITARTRATE AND ACETAMINOPHEN 1 TABLET: 5; 325 TABLET ORAL at 05:23

## 2018-12-22 RX ADMIN — Medication 2000 UNITS: at 09:34

## 2018-12-22 RX ADMIN — CEFAZOLIN SODIUM 2 G: 2 INJECTION, SOLUTION INTRAVENOUS at 01:38

## 2018-12-22 RX ADMIN — FOLIC ACID 1 MG: 1 TABLET ORAL at 09:34

## 2018-12-22 RX ADMIN — Medication 400 MG: at 09:35

## 2018-12-22 RX ADMIN — SODIUM CHLORIDE, PRESERVATIVE FREE 10 ML: 5 INJECTION INTRAVENOUS at 09:36

## 2018-12-22 ASSESSMENT — PAIN SCALES - GENERAL
PAINLEVEL_OUTOF10: 7
PAINLEVEL_OUTOF10: 7

## 2018-12-22 NOTE — DISCHARGE INSTR - COC
Weight:   Wt Readings from Last 1 Encounters:   18 194 lb 0.1 oz (88 kg)     Mental Status:  {IP PT MENTAL STATUS:}    IV Access:  { LORENZA IV ACCESS:031100716}    Nursing Mobility/ADLs:  Walking   {CHP DME VIHL:233171037}  Transfer  {CHP DME SYNB:801146787}  Bathing  {CHP DME NBWD:442997524}  Dressing  {CHP DME WEJD:974153834}  Toileting  {CHP DME EKFT:294288471}  Feeding  {CHP DME EQSV:491486162}  Med Admin  {CHP DME YLLR:837120985}  Med Delivery   { LORENZA MED Delivery:064457471}    Wound Care Documentation and Therapy:        Elimination:  Continence:   · Bowel: {YES / YS:14619}  · Bladder: {YES / MZ:56276}  Urinary Catheter: {Urinary Catheter:156794727}   Colostomy/Ileostomy/Ileal Conduit: {YES / X}       Date of Last BM: ***    Intake/Output Summary (Last 24 hours) at 18 1314  Last data filed at 18 0936   Gross per 24 hour   Intake           1167.5 ml   Output              350 ml   Net            817.5 ml     I/O last 3 completed shifts: In: 2317.5 [P.O.:360; I.V.:1907.5;  IV Piggyback:50]  Out: 370 [Urine:350; Blood:20]    Safety Concerns:     [address] Safety Concerns:613280436}    Impairments/Disabilities:      [address] Impairments/Disabilities:228514533}    Nutrition Therapy:  Current Nutrition Therapy:   [address] Diet List:031865897}    Routes of Feeding: {CHP DME Other Feedings:898439820}  Liquids: {Slp liquid thickness:47833}  Daily Fluid Restriction: {CHP DME Yes amt example:840938754}  Last Modified Barium Swallow with Video (Video Swallowing Test): {Done Not Done MMBX:378598129}    Treatments at the Time of Hospital Discharge:   Respiratory Treatments: ***  Oxygen Therapy:  {Therapy; copd oxygen:21852}  Ventilator:    { CC Vent RMBP:877730652}    Rehab Therapies: {THERAPEUTIC INTERVENTION:2160727489}  Weight Bearing Status/Restrictions: [address] Weight Bearin}  Other Medical Equipment (for information only, NOT a DME order):  {EQUIPMENT:424306170}  Other Treatments:

## 2019-02-21 ENCOUNTER — TELEPHONE (OUTPATIENT)
Dept: CARDIOLOGY CLINIC | Age: 74
End: 2019-02-21

## 2019-02-28 ENCOUNTER — INITIAL CONSULT (OUTPATIENT)
Dept: CARDIOLOGY CLINIC | Age: 74
End: 2019-02-28
Payer: MEDICARE

## 2019-02-28 VITALS
WEIGHT: 194.6 LBS | DIASTOLIC BLOOD PRESSURE: 60 MMHG | BODY MASS INDEX: 35.81 KG/M2 | HEART RATE: 56 BPM | HEIGHT: 62 IN | SYSTOLIC BLOOD PRESSURE: 100 MMHG

## 2019-02-28 DIAGNOSIS — R06.02 SOB (SHORTNESS OF BREATH): Primary | ICD-10-CM

## 2019-02-28 PROCEDURE — G8427 DOCREV CUR MEDS BY ELIG CLIN: HCPCS | Performed by: INTERNAL MEDICINE

## 2019-02-28 PROCEDURE — 1101F PT FALLS ASSESS-DOCD LE1/YR: CPT | Performed by: INTERNAL MEDICINE

## 2019-02-28 PROCEDURE — 1090F PRES/ABSN URINE INCON ASSESS: CPT | Performed by: INTERNAL MEDICINE

## 2019-02-28 PROCEDURE — 99203 OFFICE O/P NEW LOW 30 MIN: CPT | Performed by: INTERNAL MEDICINE

## 2019-02-28 PROCEDURE — G8484 FLU IMMUNIZE NO ADMIN: HCPCS | Performed by: INTERNAL MEDICINE

## 2019-02-28 PROCEDURE — 93000 ELECTROCARDIOGRAM COMPLETE: CPT | Performed by: INTERNAL MEDICINE

## 2019-02-28 PROCEDURE — 3017F COLORECTAL CA SCREEN DOC REV: CPT | Performed by: INTERNAL MEDICINE

## 2019-02-28 PROCEDURE — G8417 CALC BMI ABV UP PARAM F/U: HCPCS | Performed by: INTERNAL MEDICINE

## 2019-03-06 ENCOUNTER — PROCEDURE VISIT (OUTPATIENT)
Dept: CARDIOLOGY CLINIC | Age: 74
End: 2019-03-06
Payer: MEDICARE

## 2019-03-06 DIAGNOSIS — R06.02 SOB (SHORTNESS OF BREATH): ICD-10-CM

## 2019-03-06 LAB
LV EF: 60 %
LVEF MODALITY: NORMAL

## 2019-03-06 PROCEDURE — 93016 CV STRESS TEST SUPVJ ONLY: CPT | Performed by: INTERNAL MEDICINE

## 2019-03-06 PROCEDURE — A9500 TC99M SESTAMIBI: HCPCS | Performed by: INTERNAL MEDICINE

## 2019-03-06 PROCEDURE — 93017 CV STRESS TEST TRACING ONLY: CPT | Performed by: INTERNAL MEDICINE

## 2019-03-06 PROCEDURE — 78452 HT MUSCLE IMAGE SPECT MULT: CPT | Performed by: INTERNAL MEDICINE

## 2019-03-06 PROCEDURE — 93018 CV STRESS TEST I&R ONLY: CPT | Performed by: INTERNAL MEDICINE

## 2019-03-07 ENCOUNTER — TELEPHONE (OUTPATIENT)
Dept: CARDIOLOGY CLINIC | Age: 74
End: 2019-03-07

## 2019-03-28 ENCOUNTER — OFFICE VISIT (OUTPATIENT)
Dept: CARDIOLOGY CLINIC | Age: 74
End: 2019-03-28
Payer: MEDICARE

## 2019-03-28 VITALS
DIASTOLIC BLOOD PRESSURE: 68 MMHG | HEIGHT: 62 IN | BODY MASS INDEX: 35.19 KG/M2 | WEIGHT: 191.2 LBS | SYSTOLIC BLOOD PRESSURE: 128 MMHG | HEART RATE: 68 BPM

## 2019-03-28 DIAGNOSIS — R06.02 SOB (SHORTNESS OF BREATH): Primary | ICD-10-CM

## 2019-03-28 PROCEDURE — G8400 PT W/DXA NO RESULTS DOC: HCPCS | Performed by: INTERNAL MEDICINE

## 2019-03-28 PROCEDURE — 99213 OFFICE O/P EST LOW 20 MIN: CPT | Performed by: INTERNAL MEDICINE

## 2019-03-28 PROCEDURE — 1036F TOBACCO NON-USER: CPT | Performed by: INTERNAL MEDICINE

## 2019-03-28 PROCEDURE — 1090F PRES/ABSN URINE INCON ASSESS: CPT | Performed by: INTERNAL MEDICINE

## 2019-03-28 PROCEDURE — 1123F ACP DISCUSS/DSCN MKR DOCD: CPT | Performed by: INTERNAL MEDICINE

## 2019-03-28 PROCEDURE — 4040F PNEUMOC VAC/ADMIN/RCVD: CPT | Performed by: INTERNAL MEDICINE

## 2019-03-28 PROCEDURE — G8417 CALC BMI ABV UP PARAM F/U: HCPCS | Performed by: INTERNAL MEDICINE

## 2019-03-28 PROCEDURE — G8598 ASA/ANTIPLAT THER USED: HCPCS | Performed by: INTERNAL MEDICINE

## 2019-03-28 PROCEDURE — G8484 FLU IMMUNIZE NO ADMIN: HCPCS | Performed by: INTERNAL MEDICINE

## 2019-03-28 PROCEDURE — 3017F COLORECTAL CA SCREEN DOC REV: CPT | Performed by: INTERNAL MEDICINE

## 2019-03-28 PROCEDURE — G8427 DOCREV CUR MEDS BY ELIG CLIN: HCPCS | Performed by: INTERNAL MEDICINE

## 2019-05-09 LAB
ALBUMIN SERPL-MCNC: 4.3 G/DL
ALP BLD-CCNC: 143 U/L
ALT SERPL-CCNC: 15 U/L
ANION GAP SERPL CALCULATED.3IONS-SCNC: 0 MMOL/L
AST SERPL-CCNC: 19 U/L
BASOPHILS ABSOLUTE: 0.1 /ΜL
BASOPHILS RELATIVE PERCENT: 1.8 %
BILIRUB SERPL-MCNC: 0.3 MG/DL (ref 0.1–1.4)
BUN BLDV-MCNC: 27 MG/DL
CALCIUM SERPL-MCNC: 9.3 MG/DL
CHLORIDE BLD-SCNC: 107 MMOL/L
CO2: 25 MMOL/L
CREAT SERPL-MCNC: 0.9 MG/DL
EOSINOPHILS ABSOLUTE: 0.3 /ΜL
EOSINOPHILS RELATIVE PERCENT: 6.8 %
GFR CALCULATED: 64
GLUCOSE BLD-MCNC: 100 MG/DL
HCT VFR BLD CALC: 39 % (ref 36–46)
HEMOGLOBIN: 13.1 G/DL (ref 12–16)
LYMPHOCYTES ABSOLUTE: 1.3 /ΜL
LYMPHOCYTES RELATIVE PERCENT: 28.7 %
MCH RBC QN AUTO: 33.2 PG
MCHC RBC AUTO-ENTMCNC: 33.5 G/DL
MCV RBC AUTO: 99.1 FL
MONOCYTES ABSOLUTE: 0.4 /ΜL
MONOCYTES RELATIVE PERCENT: 7.9 %
NEUTROPHILS ABSOLUTE: 2.6 /ΜL
NEUTROPHILS RELATIVE PERCENT: 54.8 %
PLATELET # BLD: 217 K/ΜL
PMV BLD AUTO: NORMAL FL
POTASSIUM SERPL-SCNC: 4.4 MMOL/L
RBC # BLD: 3.94 10^6/ΜL
SODIUM BLD-SCNC: 143 MMOL/L
TOTAL PROTEIN: 6.8
WBC # BLD: 4.7 10^3/ML

## 2019-05-31 ENCOUNTER — HOSPITAL ENCOUNTER (OUTPATIENT)
Age: 74
Discharge: HOME | End: 2019-05-31
Payer: MEDICARE

## 2019-05-31 LAB
ALBUMIN SERPL-MCNC: 4.6 GM/DL (ref 3.4–5)
ANION GAP SERPL CALCULATED.3IONS-SCNC: 11 MMOL/L (ref 4–16)
BUN BLDV-MCNC: 23 MG/DL (ref 6–23)
CALCIUM SERPL-MCNC: 9.4 MG/DL (ref 8.3–10.6)
CHLORIDE BLD-SCNC: 105 MMOL/L (ref 99–110)
CO2: 27 MMOL/L (ref 21–32)
CREAT SERPL-MCNC: 1 MG/DL (ref 0.6–1.1)
GFR AFRICAN AMERICAN: >60 ML/MIN/1.73M2
GFR NON-AFRICAN AMERICAN: 54 ML/MIN/1.73M2
GLUCOSE BLD-MCNC: 81 MG/DL (ref 70–99)
MAGNESIUM: 1.8 MG/DL (ref 1.8–2.4)
PHOSPHORUS: 3.4 MG/DL (ref 2.5–4.9)
POTASSIUM SERPL-SCNC: 4.2 MMOL/L (ref 3.5–5.1)
SODIUM BLD-SCNC: 143 MMOL/L (ref 135–145)

## 2019-05-31 PROCEDURE — 80069 RENAL FUNCTION PANEL: CPT

## 2019-05-31 PROCEDURE — 83735 ASSAY OF MAGNESIUM: CPT

## 2019-05-31 PROCEDURE — 36415 COLL VENOUS BLD VENIPUNCTURE: CPT

## 2019-07-11 ENCOUNTER — APPOINTMENT (OUTPATIENT)
Dept: GENERAL RADIOLOGY | Age: 74
End: 2019-07-11
Payer: MEDICARE

## 2019-07-11 VITALS
SYSTOLIC BLOOD PRESSURE: 187 MMHG | DIASTOLIC BLOOD PRESSURE: 72 MMHG | RESPIRATION RATE: 16 BRPM | BODY MASS INDEX: 35.15 KG/M2 | HEIGHT: 62 IN | OXYGEN SATURATION: 99 % | HEART RATE: 65 BPM | TEMPERATURE: 98.2 F | WEIGHT: 191 LBS

## 2019-07-11 PROCEDURE — 73110 X-RAY EXAM OF WRIST: CPT

## 2019-07-11 PROCEDURE — 73660 X-RAY EXAM OF TOE(S): CPT

## 2019-07-11 ASSESSMENT — PAIN DESCRIPTION - ORIENTATION: ORIENTATION: LEFT

## 2019-07-11 ASSESSMENT — PAIN SCALES - GENERAL: PAINLEVEL_OUTOF10: 6

## 2019-07-11 ASSESSMENT — PAIN DESCRIPTION - LOCATION: LOCATION: WRIST;TOE (COMMENT WHICH ONE)

## 2019-07-11 ASSESSMENT — PAIN DESCRIPTION - PAIN TYPE: TYPE: ACUTE PAIN

## 2019-07-12 ENCOUNTER — HOSPITAL ENCOUNTER (EMERGENCY)
Age: 74
Discharge: HOME | End: 2019-07-12
Payer: MEDICARE

## 2019-07-12 DIAGNOSIS — S62.102A CLOSED FRACTURE OF LEFT WRIST, INITIAL ENCOUNTER: Primary | ICD-10-CM

## 2019-07-12 DIAGNOSIS — S92.502A CLOSED FRACTURE OF FOURTH TOE OF LEFT FOOT, INITIAL ENCOUNTER: ICD-10-CM

## 2019-07-12 PROCEDURE — 99283 EMERGENCY DEPT VISIT LOW MDM: CPT

## 2019-07-12 PROCEDURE — 4500000028 HC INTERMEDIATE PROCEDURE

## 2019-07-12 PROCEDURE — 6370000000 HC RX 637 (ALT 250 FOR IP): Performed by: PHYSICIAN ASSISTANT

## 2019-07-12 RX ORDER — IBUPROFEN 600 MG/1
600 TABLET ORAL ONCE
Status: COMPLETED | OUTPATIENT
Start: 2019-07-12 | End: 2019-07-12

## 2019-07-12 RX ADMIN — IBUPROFEN 600 MG: 600 TABLET, FILM COATED ORAL at 01:45

## 2019-07-12 ASSESSMENT — PAIN SCALES - GENERAL: PAINLEVEL_OUTOF10: 7

## 2019-07-12 NOTE — ED PROVIDER NOTES
 Not on file   Social Needs    Financial resource strain: Not on file    Food insecurity:     Worry: Not on file     Inability: Not on file    Transportation needs:     Medical: Not on file     Non-medical: Not on file   Tobacco Use    Smoking status: Former Smoker     Packs/day: 0.50     Types: Cigarettes     Start date:      Last attempt to quit: 1965     Years since quittin.6    Smokeless tobacco: Never Used   Substance and Sexual Activity    Alcohol use: Yes     Comment: rare    Drug use: No    Sexual activity: Not Currently     Partners: Male   Lifestyle    Physical activity:     Days per week: Not on file     Minutes per session: Not on file    Stress: Not on file   Relationships    Social connections:     Talks on phone: Not on file     Gets together: Not on file     Attends Rastafari service: Not on file     Active member of club or organization: Not on file     Attends meetings of clubs or organizations: Not on file     Relationship status: Not on file    Intimate partner violence:     Fear of current or ex partner: Not on file     Emotionally abused: Not on file     Physically abused: Not on file     Forced sexual activity: Not on file   Other Topics Concern    Not on file   Social History Narrative    Not on file     No current facility-administered medications for this encounter.       Current Outpatient Medications   Medication Sig Dispense Refill    atorvastatin (LIPITOR) 80 MG tablet Take 80 mg by mouth nightly       aspirin 81 MG tablet Take 81 mg by mouth daily      hydroxychloroquine (PLAQUENIL) 200 MG tablet Take 200 mg by mouth 2 times daily       calcium carbonate 600 MG TABS tablet Take 1 tablet by mouth daily      magnesium oxide (MAG-OX) 400 MG tablet Take 400 mg by mouth daily      Cholecalciferol (VITAMIN D) 2000 units CAPS capsule Take 1 capsule by mouth daily      folic acid (FOLVITE) 1 MG tablet Take 1 tablet by mouth daily 30 tablet 3    amLODIPine

## 2019-07-16 ENCOUNTER — TELEPHONE (OUTPATIENT)
Dept: CARDIOLOGY CLINIC | Age: 74
End: 2019-07-16

## 2019-08-15 LAB
BUN BLDV-MCNC: 32 MG/DL
CALCIUM SERPL-MCNC: 9.6 MG/DL
CHLORIDE BLD-SCNC: 104 MMOL/L
CO2: 21 MMOL/L
CREAT SERPL-MCNC: 1.1 MG/DL
GFR CALCULATED: 50
GLUCOSE BLD-MCNC: 103 MG/DL
POTASSIUM SERPL-SCNC: 4.7 MMOL/L
SODIUM BLD-SCNC: 140 MMOL/L

## 2019-10-08 ENCOUNTER — OFFICE VISIT (OUTPATIENT)
Dept: CARDIOLOGY CLINIC | Age: 74
End: 2019-10-08
Payer: MEDICARE

## 2019-10-08 VITALS
BODY MASS INDEX: 36.47 KG/M2 | WEIGHT: 198.2 LBS | DIASTOLIC BLOOD PRESSURE: 60 MMHG | HEIGHT: 62 IN | SYSTOLIC BLOOD PRESSURE: 104 MMHG | HEART RATE: 60 BPM

## 2019-10-08 DIAGNOSIS — I65.23 BILATERAL CAROTID ARTERY STENOSIS: Primary | ICD-10-CM

## 2019-10-08 DIAGNOSIS — I10 ESSENTIAL HYPERTENSION: ICD-10-CM

## 2019-10-08 DIAGNOSIS — R60.0 LOCALIZED EDEMA: ICD-10-CM

## 2019-10-08 PROCEDURE — 99213 OFFICE O/P EST LOW 20 MIN: CPT | Performed by: NURSE PRACTITIONER

## 2019-10-08 RX ORDER — CHLORTHALIDONE 25 MG/1
25 TABLET ORAL DAILY
COMMUNITY
End: 2020-11-09

## 2020-04-10 ENCOUNTER — TELEMEDICINE (OUTPATIENT)
Dept: CARDIOLOGY CLINIC | Age: 75
End: 2020-04-10
Payer: MEDICARE

## 2020-04-10 VITALS
SYSTOLIC BLOOD PRESSURE: 144 MMHG | BODY MASS INDEX: 34.41 KG/M2 | DIASTOLIC BLOOD PRESSURE: 78 MMHG | HEIGHT: 62 IN | HEART RATE: 61 BPM | WEIGHT: 187 LBS

## 2020-04-10 PROCEDURE — 1090F PRES/ABSN URINE INCON ASSESS: CPT | Performed by: INTERNAL MEDICINE

## 2020-04-10 PROCEDURE — 4040F PNEUMOC VAC/ADMIN/RCVD: CPT | Performed by: INTERNAL MEDICINE

## 2020-04-10 PROCEDURE — 1123F ACP DISCUSS/DSCN MKR DOCD: CPT | Performed by: INTERNAL MEDICINE

## 2020-04-10 PROCEDURE — 3017F COLORECTAL CA SCREEN DOC REV: CPT | Performed by: INTERNAL MEDICINE

## 2020-04-10 PROCEDURE — G8427 DOCREV CUR MEDS BY ELIG CLIN: HCPCS | Performed by: INTERNAL MEDICINE

## 2020-04-10 PROCEDURE — 99214 OFFICE O/P EST MOD 30 MIN: CPT | Performed by: INTERNAL MEDICINE

## 2020-04-10 PROCEDURE — G8400 PT W/DXA NO RESULTS DOC: HCPCS | Performed by: INTERNAL MEDICINE

## 2020-04-10 RX ORDER — TERAZOSIN 1 MG/1
0.5 CAPSULE ORAL NIGHTLY
COMMUNITY
End: 2020-11-09

## 2020-04-10 NOTE — PROGRESS NOTES
congestion, sinus pain or vertigo  Eyes: Negative for visual disturbance. Endocrine: Negative for polydipsia/polyuria  Respiratory:  shortness of breath  Cardiovascular: Negative for chest pain, dyspnea on exertion, claudication, edema, irregular heartbeat, murmur, palpitations or shortness of breath  Gastrointestinal: Negative for abdominal pain or heartburn  Genito-Urinary: Negative for urinary frequency/urgency  Musculoskeletal: Negative for muscle pain, muscular weakness, negative for pain in arm and leg or swelling in foot and leg  Neurological: Negative for dizziness, headaches, memory loss, numbness/tingling, visual changes, syncope  Dermatological: Negative for rash    Objective:  BP (!) 144/78   Pulse 61   Ht 5' 2\" (1.575 m)   Wt 187 lb (84.8 kg)   LMP  (LMP Unknown)   BMI 34.20 kg/m²   Wt Readings from Last 3 Encounters:   04/10/20 187 lb (84.8 kg)   10/08/19 198 lb 3.2 oz (89.9 kg)   07/11/19 191 lb (86.6 kg)     Body mass index is 34.2 kg/m². GENERAL - Alert, oriented, pleasant, in no apparent distress. EYES: No jaundice, no conjunctival pallor. SKIN: It is warm & dry. No rashes. No Echhymosis    HEENT - No clinically significant abnormalities seen. Neck - No jugular venous distention noted.  .   Neurologic - AxO X3    Lab Review   Lab Results   Component Value Date    TROPONINT <0.010 09/14/2015     BNP:  No results found for: BNP  PT/INR:    Lab Results   Component Value Date    INR 1.01 12/17/2018     No results found for: LABA1C  Lab Results   Component Value Date    WBC 4.7 05/09/2019    HCT 39.0 05/09/2019    MCV 99.1 05/09/2019     05/09/2019     No results found for: CHOL, TRIG, HDL, LDLCALC, LDLDIRECT, CHOLHDLRATIO  Lab Results   Component Value Date    ALT 15 05/09/2019    AST 19 05/09/2019     BMP:    Lab Results   Component Value Date     08/15/2019    K 4.7 08/15/2019     08/15/2019    CO2 21 08/15/2019    BUN 32 08/15/2019    CREATININE 1.1 08/15/2019     CMP:

## 2020-04-10 NOTE — LETTER
Analia MIRANDA [address] [facility]  1945  [id number]    Have you had any Chest Pain - No     Have you had any Shortness of Breath - Yes  If Yes - When on exertion    Have you had any dizziness - No       Have you had any palpitations - No       Do you have any edema - swelling in ankles and feet  If Yes - CHECK TO SEE IF THE EDEMA IS PITTING, NONE  How long have they been having edema - Years  If Yes - Have they worn compression stockings No    Check Venous \"LEG PROBLEM Questionnaire\"    Do you have a surgery or procedure scheduled in the near future - No  If Yes- DO EKG      Ask patient if they want to sign up for MyChart if they are not already signed up    Check to see if we have an E-MAIL on file for the patient    Check medication list thoroughly!!!  BE SURE TO ASK PATIENT IF THEY NEED MEDICATION REFILLS

## 2020-06-02 ENCOUNTER — HOSPITAL ENCOUNTER (OUTPATIENT)
Age: 75
Discharge: HOME | End: 2020-06-02
Payer: MEDICARE

## 2020-06-02 LAB
ALBUMIN SERPL-MCNC: 4.5 GM/DL (ref 3.4–5)
ANION GAP SERPL CALCULATED.3IONS-SCNC: 13 MMOL/L (ref 4–16)
BACTERIA: NEGATIVE /HPF
BILIRUBIN URINE: NEGATIVE MG/DL
BLOOD, URINE: NEGATIVE
BUN BLDV-MCNC: 37 MG/DL (ref 6–23)
CALCIUM SERPL-MCNC: 9.8 MG/DL (ref 8.3–10.6)
CHLORIDE BLD-SCNC: 103 MMOL/L (ref 99–110)
CLARITY: ABNORMAL
CO2: 29 MMOL/L (ref 21–32)
COLOR: YELLOW
CREAT SERPL-MCNC: 1.5 MG/DL (ref 0.6–1.1)
GFR AFRICAN AMERICAN: 41 ML/MIN/1.73M2
GFR NON-AFRICAN AMERICAN: 34 ML/MIN/1.73M2
GLUCOSE BLD-MCNC: 102 MG/DL (ref 70–99)
GLUCOSE, URINE: NEGATIVE MG/DL
KETONES, URINE: NEGATIVE MG/DL
LEUKOCYTE ESTERASE, URINE: ABNORMAL
MAGNESIUM: 1.8 MG/DL (ref 1.8–2.4)
MUCUS: ABNORMAL HPF
NITRITE URINE, QUANTITATIVE: NEGATIVE
PH, URINE: 5 (ref 5–8)
PHOSPHORUS: 3.5 MG/DL (ref 2.5–4.9)
POTASSIUM SERPL-SCNC: 4.4 MMOL/L (ref 3.5–5.1)
PROTEIN UA: NEGATIVE MG/DL
RBC URINE: 12 /HPF (ref 0–6)
SODIUM BLD-SCNC: 145 MMOL/L (ref 135–145)
SPECIFIC GRAVITY UA: 1.02 (ref 1–1.03)
SQUAMOUS EPITHELIAL: 3 /HPF
TRANSITIONAL EPITHELIAL: 4 /HPF
TRICHOMONAS: ABNORMAL /HPF
UROBILINOGEN, URINE: NORMAL MG/DL (ref 0.2–1)
WBC CLUMP: ABNORMAL /HPF
WBC UA: 299 /HPF (ref 0–5)
YEAST: ABNORMAL /HPF

## 2020-06-02 PROCEDURE — 81001 URINALYSIS AUTO W/SCOPE: CPT

## 2020-06-02 PROCEDURE — 80069 RENAL FUNCTION PANEL: CPT

## 2020-06-02 PROCEDURE — 36415 COLL VENOUS BLD VENIPUNCTURE: CPT

## 2020-06-02 PROCEDURE — 83735 ASSAY OF MAGNESIUM: CPT

## 2020-06-03 ENCOUNTER — HOSPITAL ENCOUNTER (OUTPATIENT)
Age: 75
Setting detail: SPECIMEN
Discharge: HOME | End: 2020-06-03
Payer: MEDICARE

## 2020-06-03 PROCEDURE — 87086 URINE CULTURE/COLONY COUNT: CPT

## 2020-06-03 PROCEDURE — 87077 CULTURE AEROBIC IDENTIFY: CPT

## 2020-06-03 PROCEDURE — 87186 SC STD MICRODIL/AGAR DIL: CPT

## 2020-06-05 LAB
CULTURE: ABNORMAL
Lab: ABNORMAL
SPECIMEN: ABNORMAL

## 2020-08-17 ENCOUNTER — HOSPITAL ENCOUNTER (OUTPATIENT)
Age: 75
Discharge: HOME | End: 2020-08-17
Payer: MEDICARE

## 2020-08-17 LAB
ALBUMIN SERPL-MCNC: 4.6 GM/DL (ref 3.4–5)
ANION GAP SERPL CALCULATED.3IONS-SCNC: 9 MMOL/L (ref 4–16)
BUN BLDV-MCNC: 28 MG/DL (ref 6–23)
CALCIUM SERPL-MCNC: 9.7 MG/DL (ref 8.3–10.6)
CHLORIDE BLD-SCNC: 106 MMOL/L (ref 99–110)
CO2: 28 MMOL/L (ref 21–32)
CREAT SERPL-MCNC: 1.1 MG/DL (ref 0.6–1.1)
GFR AFRICAN AMERICAN: 59 ML/MIN/1.73M2
GFR NON-AFRICAN AMERICAN: 49 ML/MIN/1.73M2
GLUCOSE BLD-MCNC: 90 MG/DL (ref 70–99)
MAGNESIUM: 2.1 MG/DL (ref 1.8–2.4)
PHOSPHORUS: 3.3 MG/DL (ref 2.5–4.9)
POTASSIUM SERPL-SCNC: 4.6 MMOL/L (ref 3.5–5.1)
SODIUM BLD-SCNC: 143 MMOL/L (ref 135–145)

## 2020-08-17 PROCEDURE — 36415 COLL VENOUS BLD VENIPUNCTURE: CPT

## 2020-08-17 PROCEDURE — 80069 RENAL FUNCTION PANEL: CPT

## 2020-08-17 PROCEDURE — 83735 ASSAY OF MAGNESIUM: CPT

## 2020-11-09 ENCOUNTER — TELEMEDICINE (OUTPATIENT)
Dept: CARDIOLOGY CLINIC | Age: 75
End: 2020-11-09
Payer: MEDICARE

## 2020-11-09 ENCOUNTER — TELEPHONE (OUTPATIENT)
Dept: CARDIOLOGY CLINIC | Age: 75
End: 2020-11-09

## 2020-11-09 PROBLEM — E78.2 MIXED HYPERLIPIDEMIA: Status: ACTIVE | Noted: 2020-11-09

## 2020-11-09 PROCEDURE — 1036F TOBACCO NON-USER: CPT | Performed by: NURSE PRACTITIONER

## 2020-11-09 PROCEDURE — G8427 DOCREV CUR MEDS BY ELIG CLIN: HCPCS | Performed by: NURSE PRACTITIONER

## 2020-11-09 PROCEDURE — 99214 OFFICE O/P EST MOD 30 MIN: CPT | Performed by: NURSE PRACTITIONER

## 2020-11-09 PROCEDURE — 1123F ACP DISCUSS/DSCN MKR DOCD: CPT | Performed by: NURSE PRACTITIONER

## 2020-11-09 PROCEDURE — G8400 PT W/DXA NO RESULTS DOC: HCPCS | Performed by: NURSE PRACTITIONER

## 2020-11-09 PROCEDURE — 1090F PRES/ABSN URINE INCON ASSESS: CPT | Performed by: NURSE PRACTITIONER

## 2020-11-09 PROCEDURE — 3017F COLORECTAL CA SCREEN DOC REV: CPT | Performed by: NURSE PRACTITIONER

## 2020-11-09 PROCEDURE — G8484 FLU IMMUNIZE NO ADMIN: HCPCS | Performed by: NURSE PRACTITIONER

## 2020-11-09 PROCEDURE — 4040F PNEUMOC VAC/ADMIN/RCVD: CPT | Performed by: NURSE PRACTITIONER

## 2020-11-09 PROCEDURE — G8417 CALC BMI ABV UP PARAM F/U: HCPCS | Performed by: NURSE PRACTITIONER

## 2020-11-09 ASSESSMENT — ENCOUNTER SYMPTOMS
PHOTOPHOBIA: 0
COUGH: 0
ABDOMINAL PAIN: 0
ABDOMINAL DISTENTION: 0
WHEEZING: 0
BACK PAIN: 0
SHORTNESS OF BREATH: 0

## 2020-11-09 NOTE — PROGRESS NOTES
Cardiologist of Abbey Mak and Lucia Shepard                    2020    TELEHEALTH EVALUATION -- Audio/Visual (During RZOMN-30 public health emergency)    HPI:    Eagle Rizvi (:  1945) has requested an audio/video evaluation for : carotid stenosis, HTN, and HLD. Recently had metoprolol increased to 100 mg per Dr. Marie Montoya. Review of Systems   Constitutional: Negative for unexpected weight change. HENT: Negative for tinnitus. Eyes: Negative for photophobia and visual disturbance. Respiratory: Negative for cough, shortness of breath and wheezing. Cardiovascular: Positive for leg swelling. Negative for chest pain and palpitations. Gastrointestinal: Negative for abdominal distention and abdominal pain. Endocrine: Negative for polyphagia and polyuria. Genitourinary: Negative for difficulty urinating and dyspareunia. Musculoskeletal: Negative for arthralgias and back pain. Neurological: Negative for dizziness and weakness. Hematological: Does not bruise/bleed easily. Psychiatric/Behavioral: Negative for behavioral problems. Prior to Visit Medications    Medication Sig Taking?  Authorizing Provider   atorvastatin (LIPITOR) 80 MG tablet Take 80 mg by mouth nightly  Yes Historical Provider, MD   aspirin 81 MG tablet Take 81 mg by mouth daily Yes Historical Provider, MD   calcium carbonate 600 MG TABS tablet Take 1 tablet by mouth daily Yes Historical Provider, MD   magnesium oxide (MAG-OX) 400 MG tablet Take 400 mg by mouth daily Yes Historical Provider, MD   Cholecalciferol (VITAMIN D) 2000 units CAPS capsule Take 1 capsule by mouth daily Yes Historical Provider, MD   folic acid (FOLVITE) 1 MG tablet Take 1 tablet by mouth daily Yes Anabel Sandoval MD   amLODIPine (NORVASC) 10 MG tablet Take 10 mg by mouth nightly  Yes Historical Provider, MD   metoprolol (TOPROL-XL) 50 MG XL tablet Take 50 mg by mouth nightly  Yes Historical Provider, MD   fluticasone-salmeterol (ADVAIR) 250-50 MCG/DOSE AEPB Inhale 1 puff into the lungs every 12 hours Yes Historical Provider, MD   pantoprazole (PROTONIX) 40 MG tablet Take 40 mg by mouth every 48 hours  Yes Historical Provider, MD   sertraline (ZOLOFT) 25 MG tablet Take 25 mg by mouth daily Yes Historical Provider, MD   levalbuterol (XOPENEX) 0.63 MG/3ML nebulization Take 1 ampule by nebulization every 8 hours as needed for Wheezing Yes Historical Provider, MD   hydroxychloroquine (PLAQUENIL) 200 MG tablet Take 200 mg by mouth 2 times daily   Historical Provider, MD       Social History     Tobacco Use    Smoking status: Former Smoker     Packs/day: 0.50     Types: Cigarettes     Start date:      Last attempt to quit: 1965     Years since quittin.8    Smokeless tobacco: Never Used   Substance Use Topics    Alcohol use: Yes     Comment: rare    Drug use: No        Allergies   Allergen Reactions    Ace Inhibitors      cough    Lidocaine Rash   ,   Past Medical History:   Diagnosis Date    Arthritis     \"all over\" d/t lupus    Asthma     last flare up spring 2018    Blind left eye     Carotid stenosis     r- 77% l- 50%    Depression     DJD (degenerative joint disease)     Duodenal ulcer     GERD (gastroesophageal reflux disease)     GERD (gastroesophageal reflux disease)     H/O cardiovascular stress test 2019    EF 60%, Normal    Hyperlipidemia     Hypertension     Lupus ([address]     Obesity     Optic neuropathy, left     Phlebitis     venous blood clot after giving birth    Wears glasses    ,   There is no immunization history on file for this patient.,   Health Maintenance   Topic Date Due    Hepatitis C screen  1945    Lipid screen  1956    DTaP/Tdap/Td vaccine (1 - Tdap) 1965    Shingles Vaccine (1 of 2) 1996    Colon cancer screen colonoscopy  1996    DEXA (modify frequency per FRAX score)  2001    Pneumococcal 65+ years Vaccine (1 of 1 - PPSV23) 2011    Breast cancer screen  02/09/2019    Annual Wellness Visit (AWV)  06/23/2019    Flu vaccine (1) 09/01/2020    Potassium monitoring  08/17/2021    Creatinine monitoring  08/17/2021    Hepatitis A vaccine  Aged Out    Hepatitis B vaccine  Aged Out    Hib vaccine  Aged Out    Meningococcal (ACWY) vaccine  Aged Out       PHYSICAL EXAMINATION:  [ INSTRUCTIONS:  \"[x]\" Indicates a positive item  \"[]\" Indicates a negative item  -- DELETE ALL ITEMS NOT EXAMINED]  Vital Signs: (As obtained by patient/caregiver or practitioner observation)    Blood pressure- 131/88 Heart rate-  52     Constitutional: [x] Appears well-developed and well-nourished [x] No apparent distress      [] Abnormal-   Mental status  [x] Alert and awake  [x] Oriented to person/place/time []Able to follow commands      Eyes:  EOM    [x]  Normal  [] Abnormal-  Sclera  [x]  Normal  [] Abnormal -         Discharge [x]  None visible  [] Abnormal -    HENT:   [x] Normocephalic, atraumatic.   [] Abnormal   [x] Mouth/Throat: Mucous membranes are moist.     External Ears [x] Normal  [] Abnormal-     Neck: [x] No visualized mass     Pulmonary/Chest: [x] Respiratory effort normal.  [x] No visualized signs of difficulty breathing or respiratory distress        [] Abnormal-      Musculoskeletal:   [x] Normal gait with no signs of ataxia         [x] Normal range of motion of neck        [] Abnormal-       Neurological:        [x] No Facial Asymmetry (Cranial nerve 7 motor function) (limited exam to video visit)          [x] No gaze palsy        [] Abnormal-         Skin:        [x] No significant exanthematous lesions or discoloration noted on facial skin         [] Abnormal-            Psychiatric:       [x] Normal Affect [] No Hallucinations        [] Abnormal-     Other pertinent observable physical exam findings-     Due to this being a TeleHealth encounter, evaluation of the following organ systems is limited: Vitals/Constitutional/EENT/Resp/CV/GI//MS/Neuro/Skin/Heme-Lymph-Imm. Echo:2015  1. Mild left ventricular hypertrophy noted. Patient's left ventricular  function is preserved. EF is greater than 55%. 2.  Grade 1 diastolic dysfunction is present. 3.  Mild mitral, tricuspid, pulmonic, and aortic regurgitation present. Stress test: 2019  No ischemia      All current labs and testing reviewed       ASSESSMENT/PLAN:  1. Bilateral carotid artery stenosis  -Right CEA in 2018, left carotid previously noted to have 55% stenosis of ICA. U/S of left carotid completed at PCP office will request a couple. 2. Essential hypertension  -controlled, continue current medications. 3. Mixed hyperlipidemia  LDL 58, HDL 79   -At or near goal Yes    -She is to continue current medications (lipitor) Hepatic function panel WNL. No abdominal pain. No myalgias.     -The nature of cardiac risk has been fully discussed with this patient. I have made her aware of her LDL target goal given her cardiovascular risk analysis. I have discussed the appropriate diet. The need for lifelong compliance in order to reduce risk is stressed. A regular exercise program is recommended to help achieve and maintain normal body weight, fitness and improve lipid balance. A written copy of a low fat, low cholesterol diet has been given to the patient. No follow-ups on file. An  electronic signature was used to authenticate this note. --MICA Salcido - CNP on 11/9/2020 at 11:10 AM        I confirm that this visit was completed in a telehealth setting ,using synchronous audiovisual technology for real time patient interaction . The patient identity with name and date of birth was confirmed . This evaluation of patient was done by telehealth in the setting of [facility] , which precluded assurance of safe in person visit at the time of service. The patient consented to and accepts potential risks associated with telemedical evaluation and care was taken to assess makayla presence of any medical issues that would be more  appropriate for expedited in -person care. Pursuant to the emergency declaration under the [address] [place] [address] waiver authority and the [employer] Act, this Virtual  Visit was conducted, with patient's consent, to reduce the patient's risk of exposure to COVID-19 and provide continuity of care for an established patient. Services were provided through a video synchronous discussion virtually to substitute for in-person clinic visit. I Affirm this is a Patient Initiated Episode with an Established Patient who has not had a related appointment within my department in the past 7 days or scheduled within the next 24 hours.     Total Time: minutes: 21-30 minutes

## 2021-01-06 PROBLEM — R30.0 DYSURIA: Status: ACTIVE | Noted: 2021-01-06

## 2021-01-06 PROBLEM — E83.51 HYPOCALCEMIA: Status: ACTIVE | Noted: 2021-01-06

## 2021-01-06 PROBLEM — N17.8 OTHER ACUTE KIDNEY FAILURE (HCC): Status: ACTIVE | Noted: 2021-01-06

## 2021-01-06 PROBLEM — R80.8 OTHER PROTEINURIA: Status: ACTIVE | Noted: 2021-01-06

## 2021-01-06 PROBLEM — E83.42 HYPOMAGNESEMIA: Status: ACTIVE | Noted: 2021-01-06

## 2021-02-16 PROBLEM — N18.31 STAGE 3A CHRONIC KIDNEY DISEASE (HCC): Status: ACTIVE | Noted: 2021-02-16

## 2021-05-18 ENCOUNTER — OFFICE VISIT (OUTPATIENT)
Dept: CARDIOLOGY CLINIC | Age: 76
End: 2021-05-18
Payer: MEDICARE

## 2021-05-18 VITALS
SYSTOLIC BLOOD PRESSURE: 132 MMHG | BODY MASS INDEX: 36.67 KG/M2 | HEART RATE: 66 BPM | DIASTOLIC BLOOD PRESSURE: 78 MMHG | WEIGHT: 194.2 LBS | HEIGHT: 61 IN

## 2021-05-18 DIAGNOSIS — E78.2 MIXED HYPERLIPIDEMIA: Primary | ICD-10-CM

## 2021-05-18 DIAGNOSIS — R06.02 SOB (SHORTNESS OF BREATH): ICD-10-CM

## 2021-05-18 DIAGNOSIS — I10 ESSENTIAL HYPERTENSION: ICD-10-CM

## 2021-05-18 PROCEDURE — 1123F ACP DISCUSS/DSCN MKR DOCD: CPT | Performed by: INTERNAL MEDICINE

## 2021-05-18 PROCEDURE — G8427 DOCREV CUR MEDS BY ELIG CLIN: HCPCS | Performed by: INTERNAL MEDICINE

## 2021-05-18 PROCEDURE — G8417 CALC BMI ABV UP PARAM F/U: HCPCS | Performed by: INTERNAL MEDICINE

## 2021-05-18 PROCEDURE — 1036F TOBACCO NON-USER: CPT | Performed by: INTERNAL MEDICINE

## 2021-05-18 PROCEDURE — 3017F COLORECTAL CA SCREEN DOC REV: CPT | Performed by: INTERNAL MEDICINE

## 2021-05-18 PROCEDURE — 4040F PNEUMOC VAC/ADMIN/RCVD: CPT | Performed by: INTERNAL MEDICINE

## 2021-05-18 PROCEDURE — 1090F PRES/ABSN URINE INCON ASSESS: CPT | Performed by: INTERNAL MEDICINE

## 2021-05-18 PROCEDURE — 99213 OFFICE O/P EST LOW 20 MIN: CPT | Performed by: INTERNAL MEDICINE

## 2021-05-18 PROCEDURE — G8400 PT W/DXA NO RESULTS DOC: HCPCS | Performed by: INTERNAL MEDICINE

## 2021-05-18 NOTE — LETTER
Dali Bartlett  1945  [id number]    Have you had any Chest Pain that is not new? - No     DO EKG IF: Patient has a Heart Rate above 100 or below 40     CAD (Coronary Artery Disease) patient should have one on file every 6 months        Have you had any Shortness of Breath - Yes  If Yes - When on exertion    Have you had any dizziness - No       Sitting wait 5 minutes do supine (laying down) wait 5 minutes then do standing - log each in \"vitals\" area in Epic   Be sure to ask what symptoms they are having if they get dizzy while completing ortho stats such as: room spinning, nausea, etc.    Have you had any palpitations that are not new? - No      Do you have any edema - swelling in legs    If Yes - CHECK TO SEE IF THE EDEMA IS PITTING  How long have they been having edema - Years  If Yes - Have they worn compression stockings No    Vein \"LEG PROBLEM Questionnaire\"  1. Do you have prominent leg veins? No   2. Do you have any skin discoloration? No  3. Do you have any healed or active sores? No  4. Do you have swelling of the legs? Yes  5. Do you have a family history of varicose veins? No  6. Does your profession involve pro-longed        standing or heavy lifting? No  7. Have you been fighting overweight problems? Yes  8. Do you have restless legs? No  9. Do you have any night time cramps? Yes  10. Do you have any of the following in your legs:        NONE     When did you have your last labs drawn   Where did you have them done   What doctor ordered     If we do not have these labs you are retrieve these labs for these providers!     Do you have a surgery or procedure scheduled in the near future - No

## 2021-05-18 NOTE — PROGRESS NOTES
Brad Perez  is a  Established patient  ,77 y.o.   female here for evaluation of the following chief complaint(s):    Here for yearly fu        SUBJECTIVE/OBJECTIVE:  HPI : h/o  Htn, hyperlipidimea. , CEA now here  Has mildrecurrent exertional SOB for sometime , has no CP, has mild thor LE edema as well. Review of Systems    SOB    Vitals:    05/18/21 1106   BP: 132/78   Pulse: 66   Weight: 194 lb 3.2 oz (88.1 kg)   Height: 5' 1\" (1.549 m)     /78   Pulse 66   Ht 5' 1\" (1.549 m)   Wt 194 lb 3.2 oz (88.1 kg)   LMP  (LMP Unknown)   BMI 36.69 kg/m²   Patient-Reported Vitals 2/16/2021   Patient-Reported Weight -   Patient-Reported Height -   Patient-Reported Systolic 303   Patient-Reported Diastolic 80   Patient-Reported Pulse 60     Wt Readings from Last 3 Encounters:   05/18/21 194 lb 3.2 oz (88.1 kg)   04/10/20 187 lb (84.8 kg)   10/08/19 198 lb 3.2 oz (89.9 kg)     Body mass index is 36.69 kg/m². Physical Exam     Neck: JVD  no    Lungs : clear    Cardio : Si and S2 audilble      Ext: edema  tr    All pertinent data reviewed  y    Meds : reviewed   y      Tests ordered    y    ASSESSMENT/PLAN:    - SOB; Will get [place] do ETT has back issues    -  Hypertension: Patients blood pressure is normal. Patient is advised about low sodium diet. Present medical regimen will not be changed. On HCTZ, avapro per PCP      - CEA  stable      - echo per PCP        -  LIPID MANAGEMENT:  Importance of lipid levels discussed with patient   and patient was given dietary advice. NCEP- ATP III guidelines reviewed with patient. -   Changes  in medicines made: No                                -    An electronic signature was used to authenticate this note.     --Blanca Shukla MD

## 2021-05-26 ENCOUNTER — PROCEDURE VISIT (OUTPATIENT)
Dept: CARDIOLOGY CLINIC | Age: 76
End: 2021-05-26
Payer: MEDICARE

## 2021-05-26 DIAGNOSIS — I10 ESSENTIAL HYPERTENSION: ICD-10-CM

## 2021-05-26 DIAGNOSIS — R06.02 SOB (SHORTNESS OF BREATH): ICD-10-CM

## 2021-05-26 DIAGNOSIS — E78.2 MIXED HYPERLIPIDEMIA: ICD-10-CM

## 2021-05-26 LAB
LV EF: 60 %
LVEF MODALITY: NORMAL

## 2021-05-26 PROCEDURE — 93017 CV STRESS TEST TRACING ONLY: CPT | Performed by: INTERNAL MEDICINE

## 2021-05-26 PROCEDURE — A9500 TC99M SESTAMIBI: HCPCS | Performed by: INTERNAL MEDICINE

## 2021-05-26 PROCEDURE — 93016 CV STRESS TEST SUPVJ ONLY: CPT | Performed by: INTERNAL MEDICINE

## 2021-05-26 PROCEDURE — 93018 CV STRESS TEST I&R ONLY: CPT | Performed by: INTERNAL MEDICINE

## 2021-05-26 PROCEDURE — 78452 HT MUSCLE IMAGE SPECT MULT: CPT | Performed by: INTERNAL MEDICINE

## 2021-05-27 ENCOUNTER — TELEPHONE (OUTPATIENT)
Dept: CARDIOLOGY CLINIC | Age: 76
End: 2021-05-27

## 2021-05-27 NOTE — TELEPHONE ENCOUNTER
Results given to the patient. Normal tracer uptake in all segments of myocardium on stress ans rest    images. Normal Lexiscan nuclear scintigraphic study suggestive of normal    myocardial perfusion. Gated images demonstrate normal left ventricular    systolic function with EF of 60 %.         Recommendation    Continue present medical therapy and followup in office as scheduled.

## 2021-06-26 LAB — B-TYPE NATRIURETIC PEPTIDE: 840 PG/ML

## 2022-05-17 ENCOUNTER — OFFICE VISIT (OUTPATIENT)
Dept: CARDIOLOGY CLINIC | Age: 77
End: 2022-05-17
Payer: MEDICARE

## 2022-05-17 VITALS
DIASTOLIC BLOOD PRESSURE: 72 MMHG | HEART RATE: 55 BPM | SYSTOLIC BLOOD PRESSURE: 118 MMHG | WEIGHT: 196.6 LBS | HEIGHT: 62 IN | BODY MASS INDEX: 36.18 KG/M2

## 2022-05-17 DIAGNOSIS — R06.02 SOB (SHORTNESS OF BREATH): ICD-10-CM

## 2022-05-17 DIAGNOSIS — I65.23 BILATERAL CAROTID ARTERY STENOSIS: Primary | ICD-10-CM

## 2022-05-17 PROCEDURE — 99214 OFFICE O/P EST MOD 30 MIN: CPT | Performed by: INTERNAL MEDICINE

## 2022-05-17 PROCEDURE — 1036F TOBACCO NON-USER: CPT | Performed by: INTERNAL MEDICINE

## 2022-05-17 PROCEDURE — 1090F PRES/ABSN URINE INCON ASSESS: CPT | Performed by: INTERNAL MEDICINE

## 2022-05-17 PROCEDURE — G8427 DOCREV CUR MEDS BY ELIG CLIN: HCPCS | Performed by: INTERNAL MEDICINE

## 2022-05-17 PROCEDURE — 1123F ACP DISCUSS/DSCN MKR DOCD: CPT | Performed by: INTERNAL MEDICINE

## 2022-05-17 PROCEDURE — 93000 ELECTROCARDIOGRAM COMPLETE: CPT | Performed by: INTERNAL MEDICINE

## 2022-05-17 PROCEDURE — G8417 CALC BMI ABV UP PARAM F/U: HCPCS | Performed by: INTERNAL MEDICINE

## 2022-05-17 PROCEDURE — G8400 PT W/DXA NO RESULTS DOC: HCPCS | Performed by: INTERNAL MEDICINE

## 2022-05-17 PROCEDURE — 4040F PNEUMOC VAC/ADMIN/RCVD: CPT | Performed by: INTERNAL MEDICINE

## 2022-05-17 NOTE — PROGRESS NOTES
MG tablet Take 25 mg by mouth daily      levalbuterol (XOPENEX) 0.63 MG/3ML nebulization Take 1 ampule by nebulization every 8 hours as needed for Wheezing       No current facility-administered medications for this visit. Allergies: Ace inhibitors and Lidocaine  Past Medical History:   Diagnosis Date    Arthritis     \"all over\" d/t lupus    Asthma     last flare up spring 2018    Blind left eye     Carotid stenosis     r- 77% l- 50%    Depression     DJD (degenerative joint disease)     Duodenal ulcer     GERD (gastroesophageal reflux disease)     GERD (gastroesophageal reflux disease)     H/O cardiovascular stress test 2019    EF 60%, Normal    Hyperlipidemia     Hypertension     Lupus ([address]     Nuclear Stress 2020    Noraml EF 60%.     Obesity     Optic neuropathy, left     Phlebitis     venous blood clot after giving birth    Wears glasses      Past Surgical History:   Procedure Laterality Date    BACK SURGERY  2018    CAROTID ENDARTERECTOMY Right 2018    RIGHT CAROTID ENDARTERECTOMY performed by Aissatou Power MD at [address]  2018    DILATION AND CURETTAGE OF UTERUS  1980    KNEE ARTHROSCOPY Bilateral 2006    LAMINECTOMY  2017    ROTATOR CUFF REPAIR Right 2006    TONSILLECTOMY      as a child    TOTAL KNEE ARTHROPLASTY Right       As reviewed   Family History   Problem Relation Age of Onset    High Blood Pressure Mother     High Cholesterol Mother     Heart Disease Mother     Stroke Mother     Heart Disease Brother     Heart Attack Brother      Social History     Tobacco Use    Smoking status: Former Smoker     Packs/day: 0.50     Types: Cigarettes     Start date:      Quit date:      Years since quittin.4    Smokeless tobacco: Never Used   Substance Use Topics    Alcohol use: Yes     Comment: rare        Objective:    Vitals:    22 1038   BP: 118/72   Pulse: 55   Weight: 196 lb 9.6 oz (89.2 kg) Height: 5' 2\" (1.575 m)     /72   Pulse 55   Ht 5' 2\" (1.575 m)   Wt 196 lb 9.6 oz (89.2 kg)   LMP  (LMP Unknown)   BMI 35.96 kg/m²     Patient-Reported Vitals 2/16/2021   Patient-Reported Weight -   Patient-Reported Height -   Patient-Reported Systolic 383   Patient-Reported Diastolic 80   Patient-Reported Pulse 60        Wt Readings from Last 3 Encounters:   05/17/22 196 lb 9.6 oz (89.2 kg)   01/05/22 200 lb (90.7 kg)   07/20/21 195 lb 3.2 oz (88.5 kg)     Body mass index is 35.96 kg/m². GENERAL - Alert, oriented, pleasant, in no apparent distress. EYES: No jaundice, no conjunctival pallor. SKIN: It is warm & dry. No rashes. No Echhymosis    HEENT - No clinically significant abnormalities seen. Neck - Supple. No jugular venous distention noted. No carotid bruits. Cardiovascular - Normal S1 and S2 without obvious murmur or gallop. Extremities - No cyanosis, clubbing, or significant edema. Pulmonary - No respiratory distress. No wheezes or rales. Abdomen - No masses, tenderness, or organomegaly. Musculoskeletal - No significant edema. No joint deformities. No muscle wasting. Neurologic - Cranial nerves II through XII are grossly intact. There were no gross focal neurologic abnormalities.     Lab Review   Lab Results   Component Value Date    TROPONINT <0.010 09/14/2015     BNP:    Lab Results   Component Value Date     06/26/2021     PT/INR:    Lab Results   Component Value Date    INR 1.01 12/17/2018     No results found for: LABA1C  Lab Results   Component Value Date    WBC 4.7 05/09/2019    HCT 39.0 05/09/2019    MCV 99.1 05/09/2019     05/09/2019     No results found for: CHOL, TRIG, HDL, LDLCALC, LDLDIRECT, CHOLHDLRATIO  Lab Results   Component Value Date    ALT 15 05/09/2019    AST 19 05/09/2019     BMP:    Lab Results   Component Value Date     07/07/2021    K 4.5 07/07/2021     07/07/2021    CO2 25 07/07/2021    BUN 29 07/07/2021    CREATININE 1.2 07/07/2021     CMP:   Lab Results   Component Value Date     07/07/2021    K 4.5 07/07/2021     07/07/2021    CO2 25 07/07/2021    BUN 29 07/07/2021    PROT 6.9 09/16/2015     TSH:    Lab Results   Component Value Date    TSHHS 0.765 04/27/2016           Assessment & Plan:    -Shortness of breath the patient's stress test was unremarkable however getting short of breath hence will obtain an echocardiogram for further assessment    -  Hypertension: Patients blood pressure is normal. Patient is advised about low sodium diet. Present medical regimen will not be changed. On Toprol-XL 50 mg p.o. daily and Avapro blood pressure is well controlled          -  LIPID MANAGEMENT:  Importance of lipid levels discussed with patient   and patient was given dietary advice. NCEP- ATP III guidelines reviewed with patient. -   Changes  in medicines made: No             on Lipitor 80 mg p.o. daily we will check the lipid profile    Mortality from the morbid obesity is very high: Body mass index is 35.96 kg/m². - CEA stable no issues  - Normal stress last year             Shruthi Lake MD    [facility] [place]    Please note this report has been partially produced using speech recognition software and may contain errors related to that system including errors in grammar, punctuation, and spelling, as well as words and phrases that may be inappropriate. If there are any questions or concerns please feel free to contact the dictating provider for clarification.

## 2022-05-20 ENCOUNTER — PROCEDURE VISIT (OUTPATIENT)
Dept: CARDIOLOGY CLINIC | Age: 77
End: 2022-05-20
Payer: MEDICARE

## 2022-05-20 DIAGNOSIS — R06.02 SOB (SHORTNESS OF BREATH): ICD-10-CM

## 2022-05-20 LAB
LV EF: 58 %
LVEF MODALITY: NORMAL

## 2022-05-20 PROCEDURE — 93306 TTE W/DOPPLER COMPLETE: CPT | Performed by: INTERNAL MEDICINE

## 2022-05-25 ENCOUNTER — TELEPHONE (OUTPATIENT)
Dept: CARDIOLOGY CLINIC | Age: 77
End: 2022-05-25

## 2022-05-25 NOTE — TELEPHONE ENCOUNTER
Left ventricular function and size is normal, EF is estimated at 55-60%. Mild left ventricular hypertrophy. Normal diastolic filling pattern for age. No regional wall motion abnormalities were detected. Mildly dilated left atrium. Sclerotic, but non-stenotic aortic valve. Mitral annular calcification is present. Moderate pulmonic and mild tricuspid regurgitation present. RVSP is 33 mmHg. No evidence of pericardial effusion.      Results given to the patient

## 2022-11-10 ENCOUNTER — OFFICE VISIT (OUTPATIENT)
Dept: CARDIOLOGY CLINIC | Age: 77
End: 2022-11-10
Payer: MEDICARE

## 2022-11-10 VITALS
WEIGHT: 198.4 LBS | DIASTOLIC BLOOD PRESSURE: 62 MMHG | SYSTOLIC BLOOD PRESSURE: 128 MMHG | HEART RATE: 56 BPM | BODY MASS INDEX: 36.51 KG/M2 | HEIGHT: 62 IN

## 2022-11-10 DIAGNOSIS — I10 RESISTANT HYPERTENSION: Primary | ICD-10-CM

## 2022-11-10 DIAGNOSIS — I65.23 BILATERAL CAROTID ARTERY STENOSIS: ICD-10-CM

## 2022-11-10 DIAGNOSIS — E78.2 MIXED HYPERLIPIDEMIA: ICD-10-CM

## 2022-11-10 PROCEDURE — G8484 FLU IMMUNIZE NO ADMIN: HCPCS | Performed by: NURSE PRACTITIONER

## 2022-11-10 PROCEDURE — G8400 PT W/DXA NO RESULTS DOC: HCPCS | Performed by: NURSE PRACTITIONER

## 2022-11-10 PROCEDURE — 1090F PRES/ABSN URINE INCON ASSESS: CPT | Performed by: NURSE PRACTITIONER

## 2022-11-10 PROCEDURE — G8417 CALC BMI ABV UP PARAM F/U: HCPCS | Performed by: NURSE PRACTITIONER

## 2022-11-10 PROCEDURE — 99214 OFFICE O/P EST MOD 30 MIN: CPT | Performed by: NURSE PRACTITIONER

## 2022-11-10 PROCEDURE — 1036F TOBACCO NON-USER: CPT | Performed by: NURSE PRACTITIONER

## 2022-11-10 PROCEDURE — 3078F DIAST BP <80 MM HG: CPT | Performed by: NURSE PRACTITIONER

## 2022-11-10 PROCEDURE — G8427 DOCREV CUR MEDS BY ELIG CLIN: HCPCS | Performed by: NURSE PRACTITIONER

## 2022-11-10 PROCEDURE — 1123F ACP DISCUSS/DSCN MKR DOCD: CPT | Performed by: NURSE PRACTITIONER

## 2022-11-10 PROCEDURE — 3074F SYST BP LT 130 MM HG: CPT | Performed by: NURSE PRACTITIONER

## 2022-11-10 RX ORDER — ALENDRONATE SODIUM 70 MG/1
70 TABLET ORAL
COMMUNITY
Start: 2022-07-20

## 2022-11-10 RX ORDER — HYDROCHLOROTHIAZIDE 25 MG/1
25 TABLET ORAL DAILY
COMMUNITY
Start: 2022-02-25

## 2022-11-10 ASSESSMENT — ENCOUNTER SYMPTOMS
SHORTNESS OF BREATH: 0
COUGH: 1
ORTHOPNEA: 0

## 2022-11-10 NOTE — PROGRESS NOTES
11/10/2022  Primary cardiologist: Dr. De La Rosa December    CC:   Derrick Cade  is an established 69 y.o.  female here for a 6 month follow up on HTN HLD carotid artery disease      SUBJECTIVE/OBJECTIVE:  Derrick Cade is a 69 y.o. female with a history of hypertension, hyperlipidemia CKD stage 3, carotid artery stenosis s/p right CEA, Lupus and GERD. HPI :   Derrick Cade reports she is feeling well. She denies chest pain, shortness of breath palpitations. She denies TIA or stroke like symptoms. She keeps active and continues to do things such as raking leaves. Review of Systems   Constitutional: Negative for diaphoresis and malaise/fatigue. Cardiovascular:  Negative for chest pain, claudication, dyspnea on exertion, irregular heartbeat, leg swelling, near-syncope, orthopnea, palpitations and paroxysmal nocturnal dyspnea. Respiratory:  Positive for cough. Negative for shortness of breath. Neurological:  Negative for dizziness and light-headedness. Vitals:    11/10/22 1015   BP: 128/62   Site: Right Upper Arm   Position: Sitting   Cuff Size: Large Adult   Pulse: 56   Weight: 198 lb 6.4 oz (90 kg)   Height: 5' 2\" (1.575 m)     Patient-Reported Vitals 2/16/2021   Patient-Reported Weight -   Patient-Reported Height -   Patient-Reported Systolic 731   Patient-Reported Diastolic 80   Patient-Reported Pulse 60     Wt Readings from Last 3 Encounters:   11/10/22 198 lb 6.4 oz (90 kg)   07/06/22 197 lb (89.4 kg)   05/17/22 196 lb 9.6 oz (89.2 kg)     Body mass index is 36.29 kg/m². Physical Exam  Vitals reviewed. Constitutional:       Appearance: Normal appearance. She is obese. HENT:      Head: Normocephalic and atraumatic. Eyes:      Extraocular Movements: Extraocular movements intact. Pupils: Pupils are equal, round, and reactive to light. Neck:      Vascular: No carotid bruit. Cardiovascular:      Rate and Rhythm: Normal rate and regular rhythm. Pulses: Normal pulses.    Pulmonary:      Effort: Pulmonary effort is normal.      Breath sounds: Normal breath sounds. No wheezing or rales. Chest:      Chest wall: No tenderness. Abdominal:      General: There is no distension. Palpations: Abdomen is soft. Tenderness: There is no abdominal tenderness. Musculoskeletal:      Cervical back: No tenderness. Right lower leg: No edema. Left lower leg: No edema. Skin:     General: Skin is warm and dry. Capillary Refill: Capillary refill takes less than 2 seconds. Neurological:      Mental Status: She is alert and oriented to person, place, and time.    Psychiatric:         Mood and Affect: Mood normal.         Behavior: Behavior normal.              Current Outpatient Medications   Medication Sig Dispense Refill    hydroCHLOROthiazide (HYDRODIURIL) 25 MG tablet Take 25 mg by mouth daily      alendronate (FOSAMAX) 70 MG tablet Take 70 mg by mouth every 7 days      Irbesartan (AVAPRO PO) Take by mouth Pt unsure of dosage      atorvastatin (LIPITOR) 80 MG tablet Take 80 mg by mouth nightly       aspirin 81 MG tablet Take 81 mg by mouth daily      calcium carbonate 600 MG TABS tablet Take 1 tablet by mouth daily      magnesium oxide (MAG-OX) 400 MG tablet Take 400 mg by mouth daily      Cholecalciferol (VITAMIN D) 2000 units CAPS capsule Take 1 capsule by mouth daily      folic acid (FOLVITE) 1 MG tablet Take 1 tablet by mouth daily 30 tablet 3    amLODIPine (NORVASC) 10 MG tablet Take 10 mg by mouth nightly       metoprolol (TOPROL-XL) 50 MG XL tablet Take 50 mg by mouth nightly       fluticasone-salmeterol (ADVAIR) 250-50 MCG/DOSE AEPB Inhale 1 puff into the lungs every 12 hours      pantoprazole (PROTONIX) 40 MG tablet Take 40 mg by mouth every 48 hours       sertraline (ZOLOFT) 25 MG tablet Take 25 mg by mouth daily      levalbuterol (XOPENEX) 0.63 MG/3ML nebulization Take 1 ampule by nebulization every 8 hours as needed for Wheezing       No current facility-administered medications for this visit. All pertinent data reviewed and discussed with patient       ASSESSMENT/PLAN:      Hypertension   Resistant hypertension. Blood pressure is being controlled with multiple medications including amlodipine, hydrochlorothiazide, Avapro, Toprol  No changes in medications. Low-sodium diet    Dyslipidemia    No recent lipids available : ? If labs have been done with PCP- will request copy  Goal for LDL <100  She is on atorvastatin . No reported adverse events or reported side effects from medication(s)   encouraged healthy eating habits including low fat -low /cholesterol diet      Carotid artery disease  H/o right CEA in 2018 with DR Lane   No recent carotid ultrasounds available for review. We will check with primary care if ultrasound has been done at office, and if so will request copy. If not we will recommend she have a carotid ultrasound done for ongoing surveillance  She remains asymptomatic. On aspirin and atorvastatin which will be continued    CKD  GFR 39 creatinine 1.41  Following with DR Douglas Candelaria    Obesity   BMI 36 class 2 obesity     Tests ordered:  none -if carotid US not done at pcp will get carotid US   Follow-up  1 year      Signed:  MICA Littlejohn - BRISA, 11/10/2022, 10:28 AM    An electronic signature was used to authenticate this note. Please note this report has been partially produced using speech recognition software and may contain errors related to that system including errors in grammar, punctuation, and spelling, as well as words and phrases that may be inappropriate. If there are any questions or concerns please feel free to contact the dictating provider for clarification.

## 2022-12-09 ENCOUNTER — PROCEDURE VISIT (OUTPATIENT)
Dept: CARDIOLOGY CLINIC | Age: 77
End: 2022-12-09

## 2022-12-09 DIAGNOSIS — I65.23 BILATERAL CAROTID ARTERY STENOSIS: Primary | ICD-10-CM

## 2022-12-13 ENCOUNTER — TELEPHONE (OUTPATIENT)
Dept: CARDIOLOGY CLINIC | Age: 77
End: 2022-12-13

## 2022-12-13 NOTE — TELEPHONE ENCOUNTER
Called pt and left VM to call back with VL CAROTID BILATERAL results .  - mild carotid disease-has thyroid mass-    Please send copy to PCP and have her follow up with him

## 2022-12-13 NOTE — TELEPHONE ENCOUNTER
Spoke with pt - mild carotid disease-has thyroid mass-    Please send copy to PCP and have her follow up with him. Pt aware of mass and will follow up with PCP.

## 2023-07-11 PROBLEM — E87.5 HYPERPOTASSEMIA: Status: ACTIVE | Noted: 2023-07-11

## 2023-11-16 ENCOUNTER — OFFICE VISIT (OUTPATIENT)
Dept: CARDIOLOGY CLINIC | Age: 78
End: 2023-11-16
Payer: MEDICARE

## 2023-11-16 VITALS
WEIGHT: 184.6 LBS | DIASTOLIC BLOOD PRESSURE: 68 MMHG | OXYGEN SATURATION: 99 % | SYSTOLIC BLOOD PRESSURE: 122 MMHG | HEART RATE: 52 BPM | BODY MASS INDEX: 33.76 KG/M2

## 2023-11-16 DIAGNOSIS — I65.23 BILATERAL CAROTID ARTERY STENOSIS: ICD-10-CM

## 2023-11-16 DIAGNOSIS — I1A.0 RESISTANT HYPERTENSION: ICD-10-CM

## 2023-11-16 PROCEDURE — G8484 FLU IMMUNIZE NO ADMIN: HCPCS | Performed by: NURSE PRACTITIONER

## 2023-11-16 PROCEDURE — 1090F PRES/ABSN URINE INCON ASSESS: CPT | Performed by: NURSE PRACTITIONER

## 2023-11-16 PROCEDURE — 1036F TOBACCO NON-USER: CPT | Performed by: NURSE PRACTITIONER

## 2023-11-16 PROCEDURE — 1123F ACP DISCUSS/DSCN MKR DOCD: CPT | Performed by: NURSE PRACTITIONER

## 2023-11-16 PROCEDURE — 99214 OFFICE O/P EST MOD 30 MIN: CPT | Performed by: NURSE PRACTITIONER

## 2023-11-16 PROCEDURE — G8400 PT W/DXA NO RESULTS DOC: HCPCS | Performed by: NURSE PRACTITIONER

## 2023-11-16 PROCEDURE — 93000 ELECTROCARDIOGRAM COMPLETE: CPT | Performed by: NURSE PRACTITIONER

## 2023-11-16 PROCEDURE — G8417 CALC BMI ABV UP PARAM F/U: HCPCS | Performed by: NURSE PRACTITIONER

## 2023-11-16 PROCEDURE — 3074F SYST BP LT 130 MM HG: CPT | Performed by: NURSE PRACTITIONER

## 2023-11-16 PROCEDURE — 3078F DIAST BP <80 MM HG: CPT | Performed by: NURSE PRACTITIONER

## 2023-11-16 PROCEDURE — G8427 DOCREV CUR MEDS BY ELIG CLIN: HCPCS | Performed by: NURSE PRACTITIONER

## 2023-11-16 ASSESSMENT — ENCOUNTER SYMPTOMS
ORTHOPNEA: 0
SHORTNESS OF BREATH: 0

## 2023-11-16 NOTE — PATIENT INSTRUCTIONS
Thank you for allowing us to care for you today! We want to ensure we can follow your treatment plan and we strive to give you the best outcomes and experience possible. If you ever have a life threatening emergency and call 911 - for an ambulance (EMS)   Our providers can only care for you at:   [facility] or [facility]. Even if you have someone take you or you drive yourself we can only care for you in a [facility]. Our providers are not setup at the other healthcare locations! [facility] Laboratory Locations - No appointment necessary. Sites open Monday to Friday. Call your preferred location for test preparation, business   hours and other information you need. [employer] accepts 's. [address] [place] [address]. [address] [place], [address] [facility]  Phone: [phone]     **It is YOUR responsibilty to bring medication bottles and/or updated medication list to [facility]. This will allow us to better serve you and all your healthcare needs**  We are committed to providing you the best care possible. If you receive a survey after visiting one of our offices, please take time to share your experience concerning your physician office visit. These surveys are confidential and no health information about you is shared. We are eager to improve for you and we are counting on your feedback to help make that happen.

## 2024-07-29 ENCOUNTER — HOSPITAL ENCOUNTER (OUTPATIENT)
Age: 79
Discharge: HOME | End: 2024-07-29
Payer: MEDICARE

## 2024-07-29 ENCOUNTER — OFFICE VISIT (OUTPATIENT)
Dept: CARDIOLOGY CLINIC | Age: 79
End: 2024-07-29
Payer: MEDICARE

## 2024-07-29 ENCOUNTER — HOSPITAL ENCOUNTER (OUTPATIENT)
Dept: GENERAL RADIOLOGY | Age: 79
Discharge: HOME | End: 2024-07-29
Payer: MEDICARE

## 2024-07-29 ENCOUNTER — TELEPHONE (OUTPATIENT)
Dept: CARDIOLOGY CLINIC | Age: 79
End: 2024-07-29

## 2024-07-29 VITALS
BODY MASS INDEX: 35.3 KG/M2 | SYSTOLIC BLOOD PRESSURE: 164 MMHG | WEIGHT: 187 LBS | DIASTOLIC BLOOD PRESSURE: 76 MMHG | HEIGHT: 61 IN | HEART RATE: 65 BPM

## 2024-07-29 DIAGNOSIS — E78.2 MIXED HYPERLIPIDEMIA: Primary | ICD-10-CM

## 2024-07-29 DIAGNOSIS — Z01.810 PRE-OPERATIVE CARDIOVASCULAR EXAMINATION: ICD-10-CM

## 2024-07-29 DIAGNOSIS — R94.39 ABNORMAL NUCLEAR STRESS TEST: Primary | ICD-10-CM

## 2024-07-29 DIAGNOSIS — R07.9 CHEST PAIN, UNSPECIFIED TYPE: ICD-10-CM

## 2024-07-29 LAB
ABO/RH: NORMAL
ANION GAP SERPL CALCULATED.3IONS-SCNC: 17 MMOL/L (ref 7–16)
ANTIBODY SCREEN: NEGATIVE
BUN SERPL-MCNC: 43 MG/DL (ref 6–23)
CALCIUM SERPL-MCNC: 9.8 MG/DL (ref 8.3–10.6)
CHLORIDE BLD-SCNC: 102 MMOL/L (ref 99–110)
CO2: 19 MMOL/L (ref 21–32)
COMMENT: NORMAL
CREAT SERPL-MCNC: 1.4 MG/DL (ref 0.6–1.1)
GFR, ESTIMATED: 39 ML/MIN/1.73M2
GLUCOSE SERPL-MCNC: 110 MG/DL (ref 70–99)
HCT VFR BLD CALC: 39.8 % (ref 37–47)
HEMOGLOBIN: 12.3 GM/DL (ref 12.5–16)
MCH RBC QN AUTO: 33.3 PG (ref 27–31)
MCHC RBC AUTO-ENTMCNC: 30.9 % (ref 32–36)
MCV RBC AUTO: 107.9 FL (ref 78–100)
PDW BLD-RTO: 13.2 % (ref 11.7–14.9)
PLATELET # BLD: 293 K/CU MM (ref 140–440)
PMV BLD AUTO: 11.4 FL (ref 7.5–11.1)
POTASSIUM SERPL-SCNC: 4.7 MMOL/L (ref 3.5–5.1)
RBC # BLD: 3.69 M/CU MM (ref 4.2–5.4)
SODIUM BLD-SCNC: 138 MMOL/L (ref 135–145)
WBC # BLD: 10.3 K/CU MM (ref 4–10.5)

## 2024-07-29 PROCEDURE — G8400 PT W/DXA NO RESULTS DOC: HCPCS | Performed by: INTERNAL MEDICINE

## 2024-07-29 PROCEDURE — 86900 BLOOD TYPING SEROLOGIC ABO: CPT

## 2024-07-29 PROCEDURE — 93000 ELECTROCARDIOGRAM COMPLETE: CPT | Performed by: INTERNAL MEDICINE

## 2024-07-29 PROCEDURE — 1123F ACP DISCUSS/DSCN MKR DOCD: CPT | Performed by: INTERNAL MEDICINE

## 2024-07-29 PROCEDURE — 85027 COMPLETE CBC AUTOMATED: CPT

## 2024-07-29 PROCEDURE — 1090F PRES/ABSN URINE INCON ASSESS: CPT | Performed by: INTERNAL MEDICINE

## 2024-07-29 PROCEDURE — 86901 BLOOD TYPING SEROLOGIC RH(D): CPT

## 2024-07-29 PROCEDURE — 80048 BASIC METABOLIC PNL TOTAL CA: CPT

## 2024-07-29 PROCEDURE — 3077F SYST BP >= 140 MM HG: CPT | Performed by: INTERNAL MEDICINE

## 2024-07-29 PROCEDURE — 36415 COLL VENOUS BLD VENIPUNCTURE: CPT

## 2024-07-29 PROCEDURE — 71046 X-RAY EXAM CHEST 2 VIEWS: CPT

## 2024-07-29 PROCEDURE — G8428 CUR MEDS NOT DOCUMENT: HCPCS | Performed by: INTERNAL MEDICINE

## 2024-07-29 PROCEDURE — G8417 CALC BMI ABV UP PARAM F/U: HCPCS | Performed by: INTERNAL MEDICINE

## 2024-07-29 PROCEDURE — 99214 OFFICE O/P EST MOD 30 MIN: CPT | Performed by: INTERNAL MEDICINE

## 2024-07-29 PROCEDURE — 86850 RBC ANTIBODY SCREEN: CPT

## 2024-07-29 PROCEDURE — 3078F DIAST BP <80 MM HG: CPT | Performed by: INTERNAL MEDICINE

## 2024-07-29 PROCEDURE — 1036F TOBACCO NON-USER: CPT | Performed by: INTERNAL MEDICINE

## 2024-07-29 RX ORDER — NITROGLYCERIN 0.4 MG/1
0.4 TABLET SUBLINGUAL EVERY 5 MIN PRN
Qty: 25 TABLET | Refills: 3 | Status: SHIPPED | OUTPATIENT
Start: 2024-07-29

## 2024-07-29 RX ORDER — CHOLECALCIFEROL (VITAMIN D3) 1250 MCG
CAPSULE ORAL
COMMUNITY

## 2024-07-29 NOTE — TELEPHONE ENCOUNTER
Patient states chest tightness off and on with nausea x 4 days. Woke her up last night. OV scheduled

## 2024-07-29 NOTE — TELEPHONE ENCOUNTER
Patient given instructions over telephone on 7/29/24.  Procedure is scheduled for 7/30/24 @ 9am, w/arrival @ 7am, @ [facility]. Medication/Education Letter gone over with patient. Questions answered, Patient voiced understanding.        Patient was notified that procedure date or time could be changed due to an emergency. Patient voiced understanding.

## 2024-07-29 NOTE — TELEPHONE ENCOUNTER
[facility]     Dr. Eve Paz     LEFT HEART CATHETERIZATION WITH POSSIBLE PERCUTANEOUS CORONARY INTERVENTION    Patient Name: Abi Bartlett   : 1945  MRN# [id number]    Date of Procedure: 24 Time: 9am Arrival Time: 7am    The catheterization and angiogram are usually outpatient procedures, however if stenting is needed you may need to stay overnight. You will need to arrive at the hospital two hours before the procedure.  You will go to registration in the main lobby.  You will need to arrange for someone to drive you home.      HOSPITAL:  [facility]      X   If you have received orders for blood work and or a chest x-ray, please have         them done on assigned date at [facility],           [facility], or [facility].     X Please do not have anything by mouth after midnight prior to or 8 hours before   the procedure.    X You may take your medications with a sip of water in the morning of your               procedure or take them with you to the hospital                    X If you are taking HCTZ (Hydrochlorothiazide)   please do not take it the morning of your procedure.

## 2024-07-29 NOTE — H&P
packs/day: 0.5 packs/day for 1 year (0.5 ttl pk-yrs)       Types: Cigarettes       Start date:        Quit date: 1965       Years since quittin.6       Passive exposure: Past    Smokeless tobacco: Never   Substance Use Topics    Alcohol use: Yes       Comment: caffiene// coffee 1-2 cups a day// tea a few times/wk         Objective:     Vitals        Vitals:     24 1303 24 1314   BP: (!) 164/76 (!) 164/76   Site: Left Upper Arm     Position: Sitting     Cuff Size: Large Adult     Pulse: 65     Weight: 84.8 kg (187 lb)     Height: 1.549 m (5' 1\")           BP (!) 164/76   Pulse 65   Ht 1.549 m (5' 1\")   Wt 84.8 kg (187 lb)   LMP  (LMP Unknown)   BMI 35.33 kg/m²           2021    11:21 AM   Patient-Reported Vitals   Patient-Reported Systolic 140 mmHg   Patient-Reported Diastolic 80 mmHg   Patient-Reported Pulse 60             Wt Readings from Last 3 Encounters:   24 84.8 kg (187 lb)   24 88 kg (194 lb)   24 87.9 kg (193 lb 12.8 oz)      Body mass index is 35.33 kg/m².  GENERAL - Alert, oriented, pleasant, in no apparent distress.  EYES: No jaundice, no conjunctival pallor.  SKIN: It is warm & dry. No rashes. No Echhymosis    HEENT - No clinically significant abnormalities seen.  Neck - Supple.  No jugular venous distention noted. No carotid bruits.   Cardiovascular - Normal S1 and S2 without obvious murmur or gallop.    Extremities - No cyanosis, clubbing, or significant edema.    Pulmonary - No respiratory distress.  No wheezes or rales.    Abdomen - No masses, tenderness, or organomegaly.  Musculoskeletal - No significant edema. No joint deformities. No muscle wasting.  Neurologic - Cranial nerves II through XII are grossly intact.  There were no gross focal neurologic abnormalities.     Lab Review         Lab Results   Component Value Date/Time     TROPONINT <0.010 2015 06:45 PM      BNP:          Lab Results   Component Value Date/Time      2021

## 2024-07-29 NOTE — TELEPHONE ENCOUNTER
Patient called she has been having chest tightness  And feeling nauseas   for several days please advise

## 2024-07-29 NOTE — PROGRESS NOTES
report has been partially produced using speech recognition software and may contain errors related to that system including errors in grammar, punctuation, and spelling, as well as words and phrases that may be inappropriate. If there are any questions or concerns please feel free to contact the dictating provider for clarification.

## 2024-07-30 ENCOUNTER — HOSPITAL ENCOUNTER (OUTPATIENT)
Age: 79
Setting detail: OUTPATIENT SURGERY
Discharge: HOME | End: 2024-07-30
Attending: INTERNAL MEDICINE | Admitting: INTERNAL MEDICINE
Payer: MEDICARE

## 2024-07-30 VITALS
RESPIRATION RATE: 18 BRPM | SYSTOLIC BLOOD PRESSURE: 146 MMHG | HEART RATE: 72 BPM | WEIGHT: 185 LBS | TEMPERATURE: 96 F | BODY MASS INDEX: 34.93 KG/M2 | OXYGEN SATURATION: 96 % | DIASTOLIC BLOOD PRESSURE: 64 MMHG | HEIGHT: 61 IN

## 2024-07-30 DIAGNOSIS — I73.9 PVD (PERIPHERAL VASCULAR DISEASE) (HCC): ICD-10-CM

## 2024-07-30 DIAGNOSIS — R94.39 ABNORMAL NUCLEAR STRESS TEST: ICD-10-CM

## 2024-07-30 DIAGNOSIS — R07.9 CHEST PAIN: ICD-10-CM

## 2024-07-30 LAB — ECHO BSA: 1.9 M2

## 2024-07-30 PROCEDURE — 7100000010 HC PHASE II RECOVERY - FIRST 15 MIN: Performed by: INTERNAL MEDICINE

## 2024-07-30 PROCEDURE — 7100000011 HC PHASE II RECOVERY - ADDTL 15 MIN: Performed by: INTERNAL MEDICINE

## 2024-07-30 PROCEDURE — 93458 L HRT ARTERY/VENTRICLE ANGIO: CPT | Performed by: INTERNAL MEDICINE

## 2024-07-30 PROCEDURE — 6370000000 HC RX 637 (ALT 250 FOR IP): Performed by: INTERNAL MEDICINE

## 2024-07-30 PROCEDURE — 2580000003 HC RX 258: Performed by: INTERNAL MEDICINE

## 2024-07-30 PROCEDURE — C1769 GUIDE WIRE: HCPCS | Performed by: INTERNAL MEDICINE

## 2024-07-30 PROCEDURE — 2709999900 HC NON-CHARGEABLE SUPPLY: Performed by: INTERNAL MEDICINE

## 2024-07-30 PROCEDURE — 6360000002 HC RX W HCPCS: Performed by: INTERNAL MEDICINE

## 2024-07-30 PROCEDURE — C1894 INTRO/SHEATH, NON-LASER: HCPCS | Performed by: INTERNAL MEDICINE

## 2024-07-30 PROCEDURE — 6360000004 HC RX CONTRAST MEDICATION: Performed by: INTERNAL MEDICINE

## 2024-07-30 RX ORDER — ACETAMINOPHEN 325 MG/1
650 TABLET ORAL EVERY 4 HOURS PRN
Status: DISCONTINUED | OUTPATIENT
Start: 2024-07-30 | End: 2024-07-30 | Stop reason: HOSPADM

## 2024-07-30 RX ORDER — DIAZEPAM 5 MG/1
5 TABLET ORAL ONCE
Status: COMPLETED | OUTPATIENT
Start: 2024-07-30 | End: 2024-07-30

## 2024-07-30 RX ORDER — CHLOROPROCAINE HYDROCHLORIDE 30 MG/ML
20 INJECTION, SOLUTION EPIDURAL; INFILTRATION; INTRACAUDAL; PERINEURAL ONCE
Status: DISCONTINUED | OUTPATIENT
Start: 2024-07-30 | End: 2024-07-30 | Stop reason: HOSPADM

## 2024-07-30 RX ORDER — FENTANYL CITRATE 50 UG/ML
INJECTION, SOLUTION INTRAMUSCULAR; INTRAVENOUS PRN
Status: DISCONTINUED | OUTPATIENT
Start: 2024-07-30 | End: 2024-07-30 | Stop reason: HOSPADM

## 2024-07-30 RX ORDER — SODIUM CHLORIDE 0.9 % (FLUSH) 0.9 %
5-40 SYRINGE (ML) INJECTION PRN
Status: DISCONTINUED | OUTPATIENT
Start: 2024-07-30 | End: 2024-07-30 | Stop reason: HOSPADM

## 2024-07-30 RX ORDER — CHLOROPROCAINE HYDROCHLORIDE 30 MG/ML
INJECTION, SOLUTION EPIDURAL; INFILTRATION; INTRACAUDAL; PERINEURAL PRN
Status: DISCONTINUED | OUTPATIENT
Start: 2024-07-30 | End: 2024-07-30 | Stop reason: HOSPADM

## 2024-07-30 RX ORDER — DIPHENHYDRAMINE HCL 25 MG
25 TABLET ORAL ONCE
Status: COMPLETED | OUTPATIENT
Start: 2024-07-30 | End: 2024-07-30

## 2024-07-30 RX ORDER — MIDAZOLAM HYDROCHLORIDE 1 MG/ML
INJECTION INTRAMUSCULAR; INTRAVENOUS PRN
Status: DISCONTINUED | OUTPATIENT
Start: 2024-07-30 | End: 2024-07-30 | Stop reason: HOSPADM

## 2024-07-30 RX ORDER — SODIUM CHLORIDE 0.9 % (FLUSH) 0.9 %
5-40 SYRINGE (ML) INJECTION EVERY 12 HOURS SCHEDULED
Status: DISCONTINUED | OUTPATIENT
Start: 2024-07-30 | End: 2024-07-30 | Stop reason: HOSPADM

## 2024-07-30 RX ORDER — SODIUM CHLORIDE 9 MG/ML
INJECTION, SOLUTION INTRAVENOUS CONTINUOUS
Status: DISCONTINUED | OUTPATIENT
Start: 2024-07-30 | End: 2024-07-30 | Stop reason: HOSPADM

## 2024-07-30 RX ORDER — SODIUM CHLORIDE 9 MG/ML
INJECTION, SOLUTION INTRAVENOUS PRN
Status: DISCONTINUED | OUTPATIENT
Start: 2024-07-30 | End: 2024-07-30 | Stop reason: HOSPADM

## 2024-07-30 RX ADMIN — SODIUM CHLORIDE: 9 INJECTION, SOLUTION INTRAVENOUS at 07:25

## 2024-07-30 RX ADMIN — DIAZEPAM 5 MG: 5 TABLET ORAL at 07:25

## 2024-07-30 RX ADMIN — DIPHENHYDRAMINE HYDROCHLORIDE 25 MG: 25 TABLET ORAL at 07:25

## 2024-07-30 NOTE — DISCHARGE INSTRUCTIONS
Home instruction for Radial site Heart Catheterization:    Do not lift with affected arm for 3 days. Avoid lifting >10lbs. No flexing or bending affected wrist.    Remove dressing the next day, keep area clean and dry and covered with a new band aid daily until healed.    Do not submerge site in water for 3 days.    Slight tenderness is normal, but notify doctor if tingling of fingers/hand happens.    If bleeding or hematoma (blood collecting under the skin) occurs at site, apply pressure to slow the bleeding and notify doctor immediately.    No driving for 3 days.    For your procedure you may have been given a sedative to help you relax. This drug will make you sleepy. It is usually given in a vein (by IV).  Don't do anything for 24 hours that requires attention to detail. It takes time for the medicine effects to completely wear off.  Do not sign any legally binding contracts or documents over the next 24 hours.  For your safety, you should not drive or operate any machinery that could be dangerous until the medicine wears off and you can think clearly and react easily.

## 2024-07-30 NOTE — FLOWSHEET NOTE
Pt to pt  in wheelchair per volunteer for d/c home in private vehicle with family. Right radial site free of bleeding/hematoma at time of d/c

## 2024-07-30 NOTE — FLOWSHEET NOTE
Discharge instructions reviewed with patient per JAYCEE Sandoval Voices understanding.  Ambulated without difficulty.

## 2024-08-14 ENCOUNTER — TELEPHONE (OUTPATIENT)
Dept: CARDIOLOGY CLINIC | Age: 79
End: 2024-08-14

## 2024-08-14 NOTE — TELEPHONE ENCOUNTER
LVM for patient to call back and schedule her echo. May need to move appt depending on her echo appt. Please advise.

## 2024-08-21 ENCOUNTER — OFFICE VISIT (OUTPATIENT)
Dept: CARDIOLOGY CLINIC | Age: 79
End: 2024-08-21
Payer: MEDICARE

## 2024-08-21 VITALS
DIASTOLIC BLOOD PRESSURE: 64 MMHG | BODY MASS INDEX: 34.96 KG/M2 | HEART RATE: 70 BPM | WEIGHT: 190 LBS | OXYGEN SATURATION: 95 % | HEIGHT: 62 IN | SYSTOLIC BLOOD PRESSURE: 116 MMHG

## 2024-08-21 DIAGNOSIS — R07.9 CHEST PAIN, UNSPECIFIED TYPE: Primary | ICD-10-CM

## 2024-08-21 DIAGNOSIS — R42 DIZZINESS: ICD-10-CM

## 2024-08-21 PROCEDURE — 3078F DIAST BP <80 MM HG: CPT | Performed by: INTERNAL MEDICINE

## 2024-08-21 PROCEDURE — G8400 PT W/DXA NO RESULTS DOC: HCPCS | Performed by: INTERNAL MEDICINE

## 2024-08-21 PROCEDURE — 1123F ACP DISCUSS/DSCN MKR DOCD: CPT | Performed by: INTERNAL MEDICINE

## 2024-08-21 PROCEDURE — G8417 CALC BMI ABV UP PARAM F/U: HCPCS | Performed by: INTERNAL MEDICINE

## 2024-08-21 PROCEDURE — 1036F TOBACCO NON-USER: CPT | Performed by: INTERNAL MEDICINE

## 2024-08-21 PROCEDURE — 3074F SYST BP LT 130 MM HG: CPT | Performed by: INTERNAL MEDICINE

## 2024-08-21 PROCEDURE — 1090F PRES/ABSN URINE INCON ASSESS: CPT | Performed by: INTERNAL MEDICINE

## 2024-08-21 PROCEDURE — G8427 DOCREV CUR MEDS BY ELIG CLIN: HCPCS | Performed by: INTERNAL MEDICINE

## 2024-08-21 PROCEDURE — 99214 OFFICE O/P EST MOD 30 MIN: CPT | Performed by: INTERNAL MEDICINE

## 2024-08-21 NOTE — PATIENT INSTRUCTIONS
**It is YOUR responsibilty to bring medication bottles and/or updated medication list to EACH APPOINTMENT. This will allow us to better serve you and all your healthcare needs**  Thank you for allowing us to care for you today!   We want to ensure we can follow your treatment plan and we strive to give you the best outcomes and experience possible.   If you ever have a life threatening emergency and call 911 - for an ambulance (EMS)   Our providers can only care for you at:   [facility] or [facility].   Even if you have someone take you or you drive yourself we can only care for you in a [facility]. Our providers are not setup at the other healthcare locations!   Please be informed that if you contact our office outside of normal business hours the physician on call cannot help with any scheduling or rescheduling issues, procedure instruction questions or any type of medication issue.    We advise you for any urgent/emergency that you go to the nearest emergency room!    PLEASE CALL OUR OFFICE DURING NORMAL BUSINESS HOURS    Monday - Friday   8 am to 5 pm    [place]: [phone]    [place]: [phone]    [place]:  [phone]  We are committed to providing you the best care possible.    If you receive a survey after visiting one of our offices, please take time to share your experience concerning your physician office visit.  These surveys are confidential and no health information about you is shared.    We are eager to improve for you and we are counting on your feedback to help make that happen.

## 2024-08-21 NOTE — PROGRESS NOTES
breath hence will obtain an echocardiogram for further assessment     -  Hypertension: Patients blood pressure is normal. Patient is advised about low sodium diet. Present medical regimen will not be changed.    On Toprol-XL 50 mg p.o. daily and Avapro blood pressure is well controlled        -  LIPID MANAGEMENT:  Importance of lipid levels discussed with patient   and patient was given dietary advice. NCEP- ATP III guidelines reviewed with patient.    -   Changes  in medicines made: No             on Lipitor 80 mg p.o. daily we will check the lipid profile     Mortality from the morbid obesity is very high:     Body mass index is 35.33 kg/m².                 - CEA stable no issues    -Also has dizziness will obtain a carotid ultrasound on her     - Normal stress last year

## 2024-08-26 ENCOUNTER — TELEPHONE (OUTPATIENT)
Dept: CARDIOLOGY CLINIC | Age: 79
End: 2024-08-26

## 2024-09-24 ENCOUNTER — TELEPHONE (OUTPATIENT)
Dept: CARDIOLOGY CLINIC | Age: 79
End: 2024-09-24

## 2024-09-25 ENCOUNTER — TELEPHONE (OUTPATIENT)
Dept: CARDIOLOGY CLINIC | Age: 79
End: 2024-09-25

## 2024-10-18 ENCOUNTER — TELEPHONE (OUTPATIENT)
Dept: CARDIOLOGY CLINIC | Age: 79
End: 2024-10-18

## 2024-10-18 NOTE — TELEPHONE ENCOUNTER
Left a Vm stating on 10/28 OV she can get her EKG then and if she has any questions she can call the office back

## 2024-10-18 NOTE — TELEPHONE ENCOUNTER
Pt wanted to know if they had an order for an EKG that was ordered. Dr. Godoy will be performing surgery on pt on 11/7 and wanted on done. Please call and advise

## 2024-10-22 ENCOUNTER — TELEPHONE (OUTPATIENT)
Dept: CARDIOLOGY CLINIC | Age: 79
End: 2024-10-22

## 2024-10-22 NOTE — TELEPHONE ENCOUNTER
Pt answered and showed she understood the interpretations of her test results and I routed the results of her Vascular Carotid US per her request to her PCP due to it being referred for her PCP to evaluate the thyroid gland. I also reminded the Pt of her upcoming appointment with Jenny on 10/28/2024.         Vascular US Duplex Carotid Bilateral     Mild (<50%) stenosis in the right internal carotid artery.  Calcific plaque (proximal) in the right internal carotid artery.    Mild (<50%) stenosis in the left internal carotid artery.  Moderate and calcific plaque (proximal) in the left internal carotid artery. Ratio is 1.9    Normal antegrade flow involving the bilateral vertebral arteries.    Left External Carotid Artery: Elevated velocities are suggestive of hemodynamically significant stenosis.    Refer to PCP for evaluation of thyroid gland         Echo (TTE) complete     Left Ventricle: Normal left ventricular systolic function with a visually estimated EF of 55 - 60%. Left ventricle size is normal. Mildly increased wall thickness. Findings consistent with mild concentric hypertrophy. Normal wall motion. Diastolic filling pattern is normal for age.    Aortic Valve: Mild sclerosis of the aortic valve, left, right and noncoronary cusps. Mild stenosis of the aortic valve. AV mean gradient is 8 mmHg. AV area by continuity VTI is 1.8 cm2.    Mitral Valve: Mild annular calcification at the posterior leaflet. Mildly calcified, at the posterior leaflet. Mild regurgitation. MV mean gradient is 3 mmHg. MV area by PHT is 2.4 cm2.    Tricuspid Valve: Mild regurgitation. Normal RVSP. The estimated RVSP is 37 mmHg.    Pulmonic Valve: Moderate regurgitation.    Left Atrium: Left atrium is mildly dilated.    Pericardium: No pericardial effusion.    Image quality is fair.

## 2024-10-28 ENCOUNTER — OFFICE VISIT (OUTPATIENT)
Dept: CARDIOLOGY CLINIC | Age: 79
End: 2024-10-28
Payer: MEDICARE

## 2024-10-28 VITALS
WEIGHT: 198.6 LBS | DIASTOLIC BLOOD PRESSURE: 64 MMHG | SYSTOLIC BLOOD PRESSURE: 128 MMHG | HEIGHT: 62 IN | HEART RATE: 68 BPM | BODY MASS INDEX: 36.55 KG/M2

## 2024-10-28 DIAGNOSIS — E78.2 MIXED HYPERLIPIDEMIA: ICD-10-CM

## 2024-10-28 DIAGNOSIS — I65.21 CAROTID STENOSIS, RIGHT: ICD-10-CM

## 2024-10-28 DIAGNOSIS — I10 ESSENTIAL HYPERTENSION: ICD-10-CM

## 2024-10-28 DIAGNOSIS — Z00.00 EXAMINATION: Primary | ICD-10-CM

## 2024-10-28 PROCEDURE — 3078F DIAST BP <80 MM HG: CPT | Performed by: NURSE PRACTITIONER

## 2024-10-28 PROCEDURE — G8417 CALC BMI ABV UP PARAM F/U: HCPCS | Performed by: NURSE PRACTITIONER

## 2024-10-28 PROCEDURE — 1036F TOBACCO NON-USER: CPT | Performed by: NURSE PRACTITIONER

## 2024-10-28 PROCEDURE — 99214 OFFICE O/P EST MOD 30 MIN: CPT | Performed by: NURSE PRACTITIONER

## 2024-10-28 PROCEDURE — 93000 ELECTROCARDIOGRAM COMPLETE: CPT | Performed by: NURSE PRACTITIONER

## 2024-10-28 PROCEDURE — G8400 PT W/DXA NO RESULTS DOC: HCPCS | Performed by: NURSE PRACTITIONER

## 2024-10-28 PROCEDURE — 1090F PRES/ABSN URINE INCON ASSESS: CPT | Performed by: NURSE PRACTITIONER

## 2024-10-28 PROCEDURE — 1123F ACP DISCUSS/DSCN MKR DOCD: CPT | Performed by: NURSE PRACTITIONER

## 2024-10-28 PROCEDURE — G8484 FLU IMMUNIZE NO ADMIN: HCPCS | Performed by: NURSE PRACTITIONER

## 2024-10-28 PROCEDURE — G8427 DOCREV CUR MEDS BY ELIG CLIN: HCPCS | Performed by: NURSE PRACTITIONER

## 2024-10-28 PROCEDURE — 3074F SYST BP LT 130 MM HG: CPT | Performed by: NURSE PRACTITIONER

## 2024-10-28 ASSESSMENT — ENCOUNTER SYMPTOMS
ORTHOPNEA: 0
BACK PAIN: 1
SHORTNESS OF BREATH: 0

## 2024-10-28 NOTE — PROGRESS NOTES
10/28/2024  Primary cardiologist: Dr. Paz    CC:   Abi  is an established 79 y.o.  female here for a follow up on HTN       SUBJECTIVE/OBJECTIVE:  HPI  Abi is a 79 y.o. female with a history of hypertension, hyperlipidemia CKD stage 3, carotid artery stenosis s/p right CEA (2018), Lupus and GERD.   May underwent a left heart catheterization in July 2024 that showed mild nonobstructive CAD.  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 Her activity is limited d/t pain. She has noticed edema to her lower legs that has increased.   She has no shortness of breath or chest pain.     Review of Systems   Constitutional: Negative for diaphoresis and malaise/fatigue.   Cardiovascular:  Positive for leg swelling. Negative for chest pain, claudication, dyspnea on exertion, irregular heartbeat, near-syncope, orthopnea, palpitations and paroxysmal nocturnal dyspnea.   Respiratory:  Negative for shortness of breath.    Musculoskeletal:  Positive for back pain.   Neurological:  Negative for dizziness and light-headedness.       Vitals:    10/28/24 1515   BP: 128/64   Site: Left Lower Arm   Position: Sitting   Cuff Size: Medium Adult   Pulse: 68   Weight: 90.1 kg (198 lb 9.6 oz)   Height: 1.575 m (5' 2\")     Wt Readings from Last 3 Encounters:   10/28/24 90.1 kg (198 lb 9.6 oz)   10/18/24 86.2 kg (190 lb)   08/21/24 86.2 kg (190 lb)      Body mass index is 36.32 kg/m².     Physical Exam  Vitals reviewed.   Constitutional:       Appearance: She is obese.   Eyes:      Pupils: Pupils are equal, round, and reactive to light.   Neck:      Vascular: No carotid bruit.   Cardiovascular:      Rate and Rhythm: Normal rate and regular rhythm.      Pulses: Normal pulses.   Pulmonary:      Effort: Pulmonary effort is normal.      Breath sounds: Normal breath sounds. No rales.   Chest:      Chest wall: No tenderness.   Musculoskeletal:      Right lower leg: Edema present.      Left lower

## 2025-02-15 NOTE — PROGRESS NOTES
RHEUMATOLOGY NEW PATIENT VISIT    2025      Patient Name: Abi Bartlett  : 1945  Medical Record: [id number]      CHIEF COMPLAINT    SLE  High risk medication use    Pertinent Problems  Stage 3 CKD  GERD  Ischemic optic neuropathy   Lumba DJD s/p lumbo- sacral fusion in 2024  Alopecia   Asthma     HISTORY OF PRESENT ILLNESS    Abi Bartlett is a 80 y.o. female who was referred by Eran Hayes. She was seeing Dr Branch ( rheumatologist in [place]) for lupus diagnosed 4-5 years ago based on abnormal oral tissue biopsy performed by her dentist. She took plaquenil for one year and stopped 2-3 years ago since her oral lesions and her labs remained stable.     She denies joint pain   She has been coughing x2 weeks attributed to seasonal allergies  Hair loss: ++  Oral Ulcers: Denies  Dry eyes and mouth: Dry eyes++   Rashes: Denies  Photosensitivity: Denies   Photophobia: Denies  Raynaud's: bluish discoloration +   Chest Pain: Denies  SOB: with exertion, s/p heart cath that was negative. Attributed to deconditioning.  Blood Clots: right DVT after delivery and left retinal vein occlusion. Treated with Plavix, that she is no longer taking.   Seizures/strokes: Denies  Kidney Disease: CKD   Anemia/thrombocytopenia/leukopenia: leukocytosis, normocytic anemia.    Denies smoking, etoh,obesity+, no recreational drug use. There is no family history of RA, SLE or CTD     Current rheum meds: none     Past rheum meds: Plaquenil      REVIEW OF SYSTEMS     Constitutional:  Denies fever or chills, decreased appetite, or weight loss   Eyes:  Dry eyes +   HENT:  Denies dry mouth or oral ulcers  Respiratory: Subacute cough +   Cardiovascular:  Denies chest pain or edema   GI:  Denies abdominal pain, nausea, vomiting, bloody stools or diarrhea   :  Denies dysuria or hematuria  Musculoskeletal:  See HPI  Integument:  Denies rash   Neurologic:  Denies headache, focal weakness or sensory changes   Endocrine:  Denies

## 2025-02-16 ASSESSMENT — RHEUMATOLOGY NEW PATIENT QUESTIONNAIRE
BLACK STOOLS: N
CHEST PAIN: N
COLOR CHANGES OF HANDS OR FEET IN THE COLD: Y
UNEXPLAINED WEIGHT CHANGE: N
UNEXPLAINED HEARING LOSS: N
UNUSUAL FATIGUE: Y
MORNING STIFFNESS IN LOWER BACK: Y
SWOLLEN LEGS OR FEET: Y
ABNORMAL URINE: N
SUN SENSITIVE (SUN ALLERGY): N
NUMBNESS OR TINGLING IN HANDS OR FEET: Y
SEIZURES: N
MUSCLE WEAKNESS: Y
NAUSEA: N
SORES IN MOUTH OR NOSE: N
NODULES/BUMPS: N
HOW WOULD YOU DESCRIBE YOUR STIFFNESS ON AVERAGE: MODERATE
AGITATION: N
RASH OR ULCERS: N
INCREASED SUSCEPTIBILITY TO INFECTION: N
RASH: N
MORNING STIFFNESS: Y
LOSS OF CONSCIOUSNESS: N
HOARSE VOICE: N
UNUSUAL BLEEDING: N
EASILY LOSING TEMPER: N
COUGH: Y
DIFFICULTY SWALLOWING: N
STOMACH PAIN: N
SKIN REDNESS: N
NIGHT SWEATS: Y
BEHAVIORAL CHANGES: N
ANEMIA: Y
DRYNESS OF MOUTH: N
DIFFICULTY FALLING ASLEEP: Y
PERSISTENT DIARRHEA: N
DIFFICULTY BREATHING LYING DOWN: N
VAGINAL DRYNESS: Y
WHEEZING: Y
LIST JOINTS AFFECTED BY SWELLING IN THE PAST MONTH: FEET AND ANKLES
HEARTBURN OR REFLUX: N
PAIN OR BURNING ON URINATION: N
UNUSUALLY RAPID OR SLOWED HEART RATE: N
JAUNDICE: N
SHORTNESS OF BREATH: Y
EXCESSIVE HAIR LOSS (MORE THAN YOUR NORM): Y
SKIN TIGHTNESS: N
HEADACHES: N
VOMITING OF BLOOD OR COFFEE GROUND CONSISTENCY MATERIAL: N
ANXIETY: N
DEPRESSION: N
BLOOD IN STOOLS: N
FAINTING: N
DIFFICULTY STAYING ASLEEP: Y
JOINT PAIN: Y
JOINT SWELLING: Y
COUGHING OF BLOOD: N

## 2025-02-17 ENCOUNTER — OFFICE VISIT (OUTPATIENT)
Age: 80
End: 2025-02-17
Payer: MEDICARE

## 2025-02-17 VITALS
DIASTOLIC BLOOD PRESSURE: 68 MMHG | SYSTOLIC BLOOD PRESSURE: 138 MMHG | BODY MASS INDEX: 35.66 KG/M2 | WEIGHT: 195 LBS | HEART RATE: 73 BPM | OXYGEN SATURATION: 98 %

## 2025-02-17 DIAGNOSIS — M89.9 DISORDER OF BONE, UNSPECIFIED: ICD-10-CM

## 2025-02-17 DIAGNOSIS — Z01.89 ENCOUNTER FOR OTHER SPECIFIED SPECIAL EXAMINATIONS: ICD-10-CM

## 2025-02-17 DIAGNOSIS — R60.0 LEG EDEMA, LEFT: ICD-10-CM

## 2025-02-17 DIAGNOSIS — M32.9 SLE (SYSTEMIC LUPUS ERYTHEMATOSUS RELATED SYNDROME) (HCC): Primary | ICD-10-CM

## 2025-02-17 DIAGNOSIS — R09.89 ABNORMAL LUNG SOUNDS: ICD-10-CM

## 2025-02-17 PROCEDURE — 1090F PRES/ABSN URINE INCON ASSESS: CPT | Performed by: STUDENT IN AN ORGANIZED HEALTH CARE EDUCATION/TRAINING PROGRAM

## 2025-02-17 PROCEDURE — 1159F MED LIST DOCD IN RCRD: CPT | Performed by: STUDENT IN AN ORGANIZED HEALTH CARE EDUCATION/TRAINING PROGRAM

## 2025-02-17 PROCEDURE — G8427 DOCREV CUR MEDS BY ELIG CLIN: HCPCS | Performed by: STUDENT IN AN ORGANIZED HEALTH CARE EDUCATION/TRAINING PROGRAM

## 2025-02-17 PROCEDURE — G8400 PT W/DXA NO RESULTS DOC: HCPCS | Performed by: STUDENT IN AN ORGANIZED HEALTH CARE EDUCATION/TRAINING PROGRAM

## 2025-02-17 PROCEDURE — 99205 OFFICE O/P NEW HI 60 MIN: CPT | Performed by: STUDENT IN AN ORGANIZED HEALTH CARE EDUCATION/TRAINING PROGRAM

## 2025-02-17 PROCEDURE — 1123F ACP DISCUSS/DSCN MKR DOCD: CPT | Performed by: STUDENT IN AN ORGANIZED HEALTH CARE EDUCATION/TRAINING PROGRAM

## 2025-02-17 PROCEDURE — 3078F DIAST BP <80 MM HG: CPT | Performed by: STUDENT IN AN ORGANIZED HEALTH CARE EDUCATION/TRAINING PROGRAM

## 2025-02-17 PROCEDURE — 3075F SYST BP GE 130 - 139MM HG: CPT | Performed by: STUDENT IN AN ORGANIZED HEALTH CARE EDUCATION/TRAINING PROGRAM

## 2025-02-17 PROCEDURE — G8417 CALC BMI ABV UP PARAM F/U: HCPCS | Performed by: STUDENT IN AN ORGANIZED HEALTH CARE EDUCATION/TRAINING PROGRAM

## 2025-02-17 PROCEDURE — 1036F TOBACCO NON-USER: CPT | Performed by: STUDENT IN AN ORGANIZED HEALTH CARE EDUCATION/TRAINING PROGRAM

## 2025-02-18 ENCOUNTER — HOSPITAL ENCOUNTER (OUTPATIENT)
Dept: GENERAL RADIOLOGY | Age: 80
Discharge: HOME | End: 2025-02-18
Payer: MEDICARE

## 2025-02-18 ENCOUNTER — HOSPITAL ENCOUNTER (OUTPATIENT)
Age: 80
Discharge: HOME | End: 2025-02-18
Payer: MEDICARE

## 2025-02-18 DIAGNOSIS — M89.9 DISORDER OF BONE, UNSPECIFIED: ICD-10-CM

## 2025-02-18 DIAGNOSIS — M32.9 SLE (SYSTEMIC LUPUS ERYTHEMATOSUS RELATED SYNDROME) (HCC): ICD-10-CM

## 2025-02-18 DIAGNOSIS — Z01.89 ENCOUNTER FOR OTHER SPECIFIED SPECIAL EXAMINATIONS: ICD-10-CM

## 2025-02-18 DIAGNOSIS — R09.89 ABNORMAL LUNG SOUNDS: ICD-10-CM

## 2025-02-18 LAB
25(OH)D3 SERPL-MCNC: 65 NG/ML (ref 30–150)
ALBUMIN SERPL-MCNC: 4 G/DL (ref 3.4–5)
ALBUMIN/GLOB SERPL: 1.3 {RATIO} (ref 1.1–2.2)
ALBUMIN: 4 G/DL (ref 3.4–5)
ALP SERPL-CCNC: 115 U/L (ref 40–129)
ALT SERPL-CCNC: 13 U/L (ref 10–40)
ANION GAP SERPL CALCULATED.3IONS-SCNC: 11 MMOL/L (ref 9–17)
AST SERPL-CCNC: 21 U/L (ref 15–37)
BILIRUB DIRECT SERPL-MCNC: <0.2 MG/DL (ref 0–0.3)
BILIRUB INDIRECT SERPL-MCNC: NORMAL MG/DL (ref 0–0.7)
BILIRUB SERPL-MCNC: 0.3 MG/DL (ref 0–1)
BUN SERPL-MCNC: 35 MG/DL (ref 7–20)
C3 SERPL-MCNC: 162 MG/DL (ref 90–180)
C4 SERPL-MCNC: 21 MG/DL (ref 10–40)
CALCIUM SERPL-MCNC: 10 MG/DL (ref 8.3–10.6)
CHLORIDE SERPL-SCNC: 105 MMOL/L (ref 99–110)
CO2 SERPL-SCNC: 24 MMOL/L (ref 21–32)
CREAT SERPL-MCNC: 1.5 MG/DL (ref 0.6–1.2)
CREAT UR-MCNC: 109 MG/DL (ref 28–217)
CRP SERPL HS-MCNC: <3 MG/L (ref 0–5)
ERYTHROCYTE [DISTWIDTH] IN BLOOD BY AUTOMATED COUNT: 15.2 % (ref 11.7–14.9)
ERYTHROCYTE [SEDIMENTATION RATE] IN BLOOD BY WESTERGREN METHOD: 16 MM/HR (ref 0–30)
GFR, ESTIMATED: 34 ML/MIN/1.73M2
GLUCOSE SERPL-MCNC: 103 MG/DL (ref 74–99)
HAV IGM SERPL QL IA: NONREACTIVE
HBV CORE IGM SERPL QL IA: NONREACTIVE
HBV SURFACE AG SERPL QL IA: NONREACTIVE
HCT VFR BLD AUTO: 34.8 % (ref 37–47)
HCV AB SERPL QL IA: NONREACTIVE
HGB BLD-MCNC: 10.2 G/DL (ref 12.5–16)
MCH RBC QN AUTO: 30.4 PG (ref 27–31)
MCHC RBC AUTO-ENTMCNC: 29.3 G/DL (ref 32–36)
MCV RBC AUTO: 103.9 FL (ref 78–100)
PHOSPHATE SERPL-MCNC: 3.4 MG/DL (ref 2.5–4.9)
PLATELET # BLD AUTO: 306 K/UL (ref 140–440)
PMV BLD AUTO: 10.9 FL (ref 7.5–11.1)
POTASSIUM SERPL-SCNC: 4.6 MMOL/L (ref 3.5–5.1)
PROT SERPL-MCNC: 7 G/DL (ref 6.4–8.2)
RBC # BLD AUTO: 3.35 M/UL (ref 4.2–5.4)
SODIUM SERPL-SCNC: 140 MMOL/L (ref 136–145)
TOTAL PROTEIN, URINE: 20 MG/DL
URINE TOTAL PROTEIN CREATININE RATIO: 0.19 (ref 0–0.2)
WBC OTHER # BLD: 8.6 K/UL (ref 4–10.5)

## 2025-02-18 PROCEDURE — 82570 ASSAY OF URINE CREATININE: CPT

## 2025-02-18 PROCEDURE — 80076 HEPATIC FUNCTION PANEL: CPT

## 2025-02-18 PROCEDURE — 86140 C-REACTIVE PROTEIN: CPT

## 2025-02-18 PROCEDURE — 86480 TB TEST CELL IMMUN MEASURE: CPT

## 2025-02-18 PROCEDURE — 86200 CCP ANTIBODY: CPT

## 2025-02-18 PROCEDURE — 80069 RENAL FUNCTION PANEL: CPT

## 2025-02-18 PROCEDURE — 85027 COMPLETE CBC AUTOMATED: CPT

## 2025-02-18 PROCEDURE — 86431 RHEUMATOID FACTOR QUANT: CPT

## 2025-02-18 PROCEDURE — 85652 RBC SED RATE AUTOMATED: CPT

## 2025-02-18 PROCEDURE — 85730 THROMBOPLASTIN TIME PARTIAL: CPT

## 2025-02-18 PROCEDURE — 85610 PROTHROMBIN TIME: CPT

## 2025-02-18 PROCEDURE — 36415 COLL VENOUS BLD VENIPUNCTURE: CPT

## 2025-02-18 PROCEDURE — 86160 COMPLEMENT ANTIGEN: CPT

## 2025-02-18 PROCEDURE — 82306 VITAMIN D 25 HYDROXY: CPT

## 2025-02-18 PROCEDURE — 85613 RUSSELL VIPER VENOM DILUTED: CPT

## 2025-02-18 PROCEDURE — 80074 ACUTE HEPATITIS PANEL: CPT

## 2025-02-18 PROCEDURE — 71046 X-RAY EXAM CHEST 2 VIEWS: CPT

## 2025-02-18 PROCEDURE — 84156 ASSAY OF PROTEIN URINE: CPT

## 2025-02-19 LAB — RHEUMATOID FACTOR: <10 IU/ML

## 2025-02-21 LAB
CONFIRM APTT STACLOT: NORMAL S
CYCLIC CITRULLIN PEPTIDE AB: 9 UNITS (ref 0–19)
DRVVT SCREEN TO CONFIRM RATIO: NORMAL {RATIO}
HEPARIN NT PPP QL: NORMAL
LA 3 SCREEN W REFLEX-IMP: NORMAL
LMW HEPARIN IND PLT AB SER QL: NORMAL
MIXING DRVVT: NORMAL S
NEUTRALIZED DRVVT SCREEN RATIO: NORMAL
PROTHROMBIN TIME: 13.1 S (ref 12–15.5)
QUANTI TB GOLD PLUS: NEGATIVE
QUANTI TB1 MINUS NIL: 0.01 IU/ML
QUANTI TB2 MINUS NIL: 0 IU/ML
QUANTIFERON MITOGEN: 9.94 IU/ML
QUANTIFERON NIL: 0.06 IU/ML
SCREEN APTT: 0.92 S
SCREEN APTT: NORMAL S
SCREEN DRVVT: 0.94 S
THROMBIN TIME: NORMAL S

## 2025-02-27 ENCOUNTER — TELEPHONE (OUTPATIENT)
Age: 80
End: 2025-02-27

## 2025-02-27 ENCOUNTER — TRANSCRIBE ORDERS (OUTPATIENT)
Dept: ADMINISTRATIVE | Age: 80
End: 2025-02-27

## 2025-02-27 DIAGNOSIS — Z12.31 ENCOUNTER FOR SCREENING MAMMOGRAM FOR MALIGNANT NEOPLASM OF BREAST: Primary | ICD-10-CM

## 2025-02-27 NOTE — TELEPHONE ENCOUNTER
----- Message from Dr. Trish Jackman MD sent at 2/27/2025  5:25 AM EST -----  Please notify patient that lupus comprehensive lab has been reviewed and her labs are unremarkable for lupus at this time.  We will go over her results in detail at her next visit.

## 2025-02-28 ENCOUNTER — HOSPITAL ENCOUNTER (OUTPATIENT)
Dept: ULTRASOUND IMAGING | Age: 80
Discharge: HOME | End: 2025-02-28
Payer: MEDICARE

## 2025-02-28 ENCOUNTER — HOSPITAL ENCOUNTER (OUTPATIENT)
Dept: WOMENS IMAGING | Age: 80
Discharge: HOME | End: 2025-02-28
Payer: MEDICARE

## 2025-02-28 VITALS — HEIGHT: 62 IN | BODY MASS INDEX: 34.96 KG/M2 | WEIGHT: 190 LBS

## 2025-02-28 DIAGNOSIS — R60.0 LEG EDEMA, LEFT: ICD-10-CM

## 2025-02-28 DIAGNOSIS — M32.9 SLE (SYSTEMIC LUPUS ERYTHEMATOSUS RELATED SYNDROME) (HCC): ICD-10-CM

## 2025-02-28 DIAGNOSIS — Z12.31 ENCOUNTER FOR SCREENING MAMMOGRAM FOR MALIGNANT NEOPLASM OF BREAST: ICD-10-CM

## 2025-02-28 PROCEDURE — 77063 BREAST TOMOSYNTHESIS BI: CPT

## 2025-03-03 ENCOUNTER — TELEPHONE (OUTPATIENT)
Age: 80
End: 2025-03-03

## 2025-03-03 NOTE — TELEPHONE ENCOUNTER
Contacted office spoke with [facility] she states they do not do mouth Biopsys there so she is not sure where to locate that, she said she would send a message over to the Doctor to see what she says just in case, I will reach out to pt to see if she can remember where she went.

## 2025-03-03 NOTE — TELEPHONE ENCOUNTER
Left vm for pt to call back so we can figure out where she got her Dexa scan and mouth biopsy done at.

## 2025-03-03 NOTE — TELEPHONE ENCOUNTER
----- Message from Dr. Trish Jackman MD sent at 2/17/2025  2:23 PM EST -----  Please call Dr Branch's office @ [facility] and request for mouth tissue biopsy report and her most recent DEXA scan report.

## 2025-03-05 ENCOUNTER — TELEPHONE (OUTPATIENT)
Age: 80
End: 2025-03-05

## 2025-03-13 ENCOUNTER — TELEPHONE (OUTPATIENT)
Dept: CARDIOLOGY CLINIC | Age: 80
End: 2025-03-13

## 2025-03-13 NOTE — TELEPHONE ENCOUNTER
Pt called in and stated she starting having pain , pain is located in the left breast and under her arm and down her arm.  This has been going on for a week.     Pt has been scheduled on the 03/19 but would like to discuss this with someone.

## 2025-03-13 NOTE — TELEPHONE ENCOUNTER
Patient complains of left breast and arm pain for a week, states pain is dull. OV moved to tomorrow

## 2025-03-14 ENCOUNTER — OFFICE VISIT (OUTPATIENT)
Dept: CARDIOLOGY CLINIC | Age: 80
End: 2025-03-14
Payer: MEDICARE

## 2025-03-14 VITALS
HEART RATE: 60 BPM | BODY MASS INDEX: 35.59 KG/M2 | SYSTOLIC BLOOD PRESSURE: 138 MMHG | HEIGHT: 62 IN | WEIGHT: 193.4 LBS | DIASTOLIC BLOOD PRESSURE: 72 MMHG

## 2025-03-14 DIAGNOSIS — R07.9 CHEST PAIN, UNSPECIFIED TYPE: Primary | ICD-10-CM

## 2025-03-14 PROBLEM — R07.89 OTHER CHEST PAIN: Status: ACTIVE | Noted: 2024-07-29

## 2025-03-14 PROCEDURE — 93000 ELECTROCARDIOGRAM COMPLETE: CPT | Performed by: NURSE PRACTITIONER

## 2025-03-14 PROCEDURE — 1036F TOBACCO NON-USER: CPT | Performed by: NURSE PRACTITIONER

## 2025-03-14 PROCEDURE — 1159F MED LIST DOCD IN RCRD: CPT | Performed by: NURSE PRACTITIONER

## 2025-03-14 PROCEDURE — 1160F RVW MEDS BY RX/DR IN RCRD: CPT | Performed by: NURSE PRACTITIONER

## 2025-03-14 PROCEDURE — 1090F PRES/ABSN URINE INCON ASSESS: CPT | Performed by: NURSE PRACTITIONER

## 2025-03-14 PROCEDURE — 3078F DIAST BP <80 MM HG: CPT | Performed by: NURSE PRACTITIONER

## 2025-03-14 PROCEDURE — G8400 PT W/DXA NO RESULTS DOC: HCPCS | Performed by: NURSE PRACTITIONER

## 2025-03-14 PROCEDURE — 99212 OFFICE O/P EST SF 10 MIN: CPT | Performed by: NURSE PRACTITIONER

## 2025-03-14 PROCEDURE — 3075F SYST BP GE 130 - 139MM HG: CPT | Performed by: NURSE PRACTITIONER

## 2025-03-14 PROCEDURE — G8417 CALC BMI ABV UP PARAM F/U: HCPCS | Performed by: NURSE PRACTITIONER

## 2025-03-14 PROCEDURE — G8427 DOCREV CUR MEDS BY ELIG CLIN: HCPCS | Performed by: NURSE PRACTITIONER

## 2025-03-14 PROCEDURE — 1123F ACP DISCUSS/DSCN MKR DOCD: CPT | Performed by: NURSE PRACTITIONER

## 2025-03-14 ASSESSMENT — ENCOUNTER SYMPTOMS
SHORTNESS OF BREATH: 0
ORTHOPNEA: 0

## 2025-03-14 NOTE — PROGRESS NOTES
3/14/2025  Primary cardiologist: Dr. Paz    CC:   Abi  is an established 80 y.o.  female here for a follow up on chest pain      SUBJECTIVE/OBJECTIVE:  Abi is a 80 y.o. female with a history of hypertension, hyperlipidemia CKD stage 3, carotid artery stenosis s/p right CEA (2018), Lupus and GERD.   May underwent a left heart catheterization in July 2024 that showed mild nonobstructive CAD.  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  States the pain can start around the left axillary radiating to the left chest.  She also notes pain from left shoulder radiating to the left chest or to the left arm.  It has been ongoing and unrelenting.  She describes it as a dull ache.  She also notes she had weakness to left hand.  States it can be exacerbated with movement.  Makes it difficult to lie down and get comfortable due to the pain.    Review of Systems   Constitutional: Negative for diaphoresis and malaise/fatigue.   Cardiovascular:  Positive for chest pain. Negative for claudication, dyspnea on exertion, irregular heartbeat, leg swelling, near-syncope, orthopnea, palpitations and paroxysmal nocturnal dyspnea.   Respiratory:  Negative for shortness of breath.    Neurological:  Positive for focal weakness (left arm) and numbness (left arm). Negative for dizziness and light-headedness.       Vitals:    03/14/25 0922   BP: 138/72   BP Site: Left Upper Arm   Patient Position: Sitting   BP Cuff Size: Large Adult   Pulse: 60   Weight: 87.7 kg (193 lb 6.4 oz)   Height: 1.575 m (5' 2\")     Wt Readings from Last 3 Encounters:   03/14/25 87.7 kg (193 lb 6.4 oz)   02/28/25 86.2 kg (190 lb)   02/17/25 88.5 kg (195 lb)      Body mass index is 35.37 kg/m².     Physical Exam  Vitals reviewed.   Eyes:      Pupils: Pupils are equal, round, and reactive to light.   Cardiovascular:      Rate and Rhythm: Normal rate and regular

## 2025-03-14 NOTE — PROGRESS NOTES
CLINICAL STAFF DOCUMENTATION    Elise Whitney, BRISA     Abi MIRANDA Tri  1945  [id number]    Have you had any Chest Pain recently? - Yes left side of chest, pt described as dull aching pain that last through out the day. Hand weakness present as well. Pt stated it started under her armpit and has radiated towards her back as well.   Have you had any Shortness of Breath - Yes SOBOE  Have you had any dizziness - No  Have you had any palpitations recently? - No  Do you have any edema - swelling in ankles & feet  When did you have your last labs drawn 2/25  What doctor ordered [place]  Do we have the labs in their chart Yes  Do you have a surgery or procedure scheduled in the near future - No  Do use tobacco products? - No  Do you drink alcohol? - No  Do you use any illicit drugs? - No  Caffeine? - Yes  How much caffeine? .1  cups of coffee seldomly. Pt drinks 1 cup of tea daily.      Check medication list thoroughly!!! AND RECONCILE OUTSIDE MEDICATIONS  If dose has changed change the entire order not just the MG  BE SURE TO ASK PATIENT IF THEY NEED MEDICATION REFILLS  Verify Pharmacy and update if incorrect    Add to every patient's \"wrap up\" the following dot phrase AFTERVISITCARDIOHEARTHOUSE and ensure we explain this to our patients

## 2025-03-22 NOTE — PROGRESS NOTES
RHEUMATOLOGY FOLLOW UP VISIT    3/25/2025      Patient Name: Abi Bartlett  : 1945  Medical Record: [id number]      CHIEF COMPLAINT    Osteopenia w/ high FRAX index    Pertinent Problems  Stage 3 CKD  GERD  Ischemic optic neuropathy   Lumba DJD s/p lumbo- sacral fusion in 2024  Alopecia   Asthma     HISTORY OF PRESENT ILLNESS    Abi Bartlett is a 80 y.o. female who established on 24. She was seeing Dr Branch ( rheumatologist in [place]) for lupus diagnosed 4-5 years ago based on abnormal oral tissue biopsy performed by her dentist. She took plaquenil for one year and stopped 2-3 years ago since her oral lesions and her labs remained stable.       LCV: 24  RENETTA by IFA negative neg   Subserologies neg   Creatinine 1.5 baseline    EGFR 34 mL  LFT normal  WBC 8.6  Hemoglobin 10.5 >>12.3 >>10.2 L  .9  Platelets normal  UPC ratio 0.19  RF negative  ESR normal  C3 and C4 normal  CRP normal  Vitamin D normal  LAC negative  CCP negative  QuantiFERON negative    Her dentist office does not have records of her mouth biopsy  LE duplex left is negative for DVT 3/11/2025, see scanned report from [facility]   DEXA scan showed osteopenia w/ FRAX major osteoporotic fracture 18.6%, hip fracture 4.1%.    Subjective:   She is here to discuss her labs   She denies joint pain   She has been coughing x2 weeks attributed to seasonal allergies  Hair loss: ++  Oral Ulcers: Denies  Dry eyes and mouth: Dry eyes++   Rashes: Denies  Photosensitivity: Denies   Photophobia: Denies  Raynaud's: bluish discoloration +   Chest Pain: Denies  SOB: with exertion, s/p heart cath that was negative. Attributed to deconditioning.  Blood Clots: right DVT after delivery and left retinal vein occlusion. Treated with Plavix, that she is no longer taking.   Seizures/strokes: Denies  Kidney Disease: CKD   Anemia/thrombocytopenia/leukopenia: leukocytosis, normocytic anemia.    Denies smoking, etoh,obesity+, no recreational

## 2025-03-25 ENCOUNTER — TELEPHONE (OUTPATIENT)
Age: 80
End: 2025-03-25

## 2025-03-25 ENCOUNTER — OFFICE VISIT (OUTPATIENT)
Age: 80
End: 2025-03-25
Payer: MEDICARE

## 2025-03-25 VITALS
HEART RATE: 59 BPM | SYSTOLIC BLOOD PRESSURE: 122 MMHG | WEIGHT: 188 LBS | DIASTOLIC BLOOD PRESSURE: 82 MMHG | BODY MASS INDEX: 34.39 KG/M2 | OXYGEN SATURATION: 98 %

## 2025-03-25 DIAGNOSIS — M81.8 OTHER OSTEOPOROSIS WITHOUT CURRENT PATHOLOGICAL FRACTURE: Primary | ICD-10-CM

## 2025-03-25 PROCEDURE — 1123F ACP DISCUSS/DSCN MKR DOCD: CPT | Performed by: STUDENT IN AN ORGANIZED HEALTH CARE EDUCATION/TRAINING PROGRAM

## 2025-03-25 PROCEDURE — G8417 CALC BMI ABV UP PARAM F/U: HCPCS | Performed by: STUDENT IN AN ORGANIZED HEALTH CARE EDUCATION/TRAINING PROGRAM

## 2025-03-25 PROCEDURE — 99215 OFFICE O/P EST HI 40 MIN: CPT | Performed by: STUDENT IN AN ORGANIZED HEALTH CARE EDUCATION/TRAINING PROGRAM

## 2025-03-25 PROCEDURE — 1159F MED LIST DOCD IN RCRD: CPT | Performed by: STUDENT IN AN ORGANIZED HEALTH CARE EDUCATION/TRAINING PROGRAM

## 2025-03-25 PROCEDURE — 1090F PRES/ABSN URINE INCON ASSESS: CPT | Performed by: STUDENT IN AN ORGANIZED HEALTH CARE EDUCATION/TRAINING PROGRAM

## 2025-03-25 PROCEDURE — 3079F DIAST BP 80-89 MM HG: CPT | Performed by: STUDENT IN AN ORGANIZED HEALTH CARE EDUCATION/TRAINING PROGRAM

## 2025-03-25 PROCEDURE — 1036F TOBACCO NON-USER: CPT | Performed by: STUDENT IN AN ORGANIZED HEALTH CARE EDUCATION/TRAINING PROGRAM

## 2025-03-25 PROCEDURE — G8400 PT W/DXA NO RESULTS DOC: HCPCS | Performed by: STUDENT IN AN ORGANIZED HEALTH CARE EDUCATION/TRAINING PROGRAM

## 2025-03-25 PROCEDURE — 3074F SYST BP LT 130 MM HG: CPT | Performed by: STUDENT IN AN ORGANIZED HEALTH CARE EDUCATION/TRAINING PROGRAM

## 2025-03-25 PROCEDURE — G8427 DOCREV CUR MEDS BY ELIG CLIN: HCPCS | Performed by: STUDENT IN AN ORGANIZED HEALTH CARE EDUCATION/TRAINING PROGRAM

## 2025-03-25 NOTE — PATIENT INSTRUCTIONS
Patient Instructions  I have ordered Prolia, you will be notified when prior authorization has been completed  If you do not hear anything from our specialty pharmacy in the next 7 days please notify my clinic  Continue calcium and vitamin D  RTC in 6 weeks for prolia injection

## 2025-03-25 NOTE — TELEPHONE ENCOUNTER
----- Message from Dr. Trish Jackman MD sent at 3/25/2025 11:28 AM EDT -----  Please fax my encounter note on 3/25/2025 to have nephrologist (Dr. Collins) thank you

## 2025-05-12 NOTE — PROGRESS NOTES
RHEUMATOLOGY FOLLOW UP VISIT    5/15/2025      Patient Name: Abi Bartlett  : 1945  Medical Record: [id number]      CHIEF COMPLAINT    Osteopenia w/ high FRAX index    Pertinent Problems  Stage 3 CKD  GERD  Ischemic optic neuropathy   Lumba DJD s/p lumbo- sacral fusion in 2024  Alopecia   Asthma     HISTORY OF PRESENT ILLNESS    Abi Bartlett is a 80 y.o. female who established on 24. She was seeing Dr Branch ( rheumatologist in [place]) for lupus diagnosed 4-5 years ago based on abnormal oral tissue biopsy performed by her dentist. She took plaquenil for one year and stopped 2-3 years ago since her oral lesions and her labs remained stable.       LCV: 3/25/25  RENETTA by IFA negative neg   Subserologies neg   Creatinine 1.5 baseline    EGFR 34 mL  LFT normal  WBC 8.6  Hemoglobin 10.5 >>12.3 >>10.2 L  .9  Platelets normal  UPC ratio 0.19  RF negative  ESR normal  C3 and C4 normal  CRP normal  Vitamin D normal  LAC negative  CCP negative  QuantiFERON negative    Her dentist office does not have records of her mouth biopsy  LE duplex left is negative for DVT 3/11/2025, see scanned report from [facility]   DEXA scan showed osteopenia w/ FRAX major osteoporotic fracture 18.6%, hip fracture 4.1%.    Subjective:   Today she is here for prolia   She denies joint pain   She has been coughing x2 weeks attributed to seasonal allergies  Hair loss: ++  Oral Ulcers: Denies  Dry eyes and mouth: Dry eyes++   Rashes: Denies  Photosensitivity: Denies   Photophobia: Denies  Raynaud's: bluish discoloration +   Chest Pain: Denies  SOB: with exertion, s/p heart cath that was negative. Attributed to deconditioning.  Blood Clots: right DVT after delivery and left retinal vein occlusion. Treated with Plavix, that she is no longer taking.   Seizures/strokes: Denies  Kidney Disease: CKD   Anemia/thrombocytopenia/leukopenia: leukocytosis, normocytic anemia.    Denies smoking, etoh,obesity+, no recreational drug

## 2025-05-15 ENCOUNTER — OFFICE VISIT (OUTPATIENT)
Age: 80
End: 2025-05-15
Payer: MEDICARE

## 2025-05-15 VITALS
DIASTOLIC BLOOD PRESSURE: 78 MMHG | WEIGHT: 187.4 LBS | OXYGEN SATURATION: 98 % | SYSTOLIC BLOOD PRESSURE: 120 MMHG | BODY MASS INDEX: 34.27 KG/M2 | HEART RATE: 58 BPM

## 2025-05-15 DIAGNOSIS — M81.8 OTHER OSTEOPOROSIS WITHOUT CURRENT PATHOLOGICAL FRACTURE: Primary | ICD-10-CM

## 2025-05-15 PROCEDURE — G8427 DOCREV CUR MEDS BY ELIG CLIN: HCPCS | Performed by: STUDENT IN AN ORGANIZED HEALTH CARE EDUCATION/TRAINING PROGRAM

## 2025-05-15 PROCEDURE — 3074F SYST BP LT 130 MM HG: CPT | Performed by: STUDENT IN AN ORGANIZED HEALTH CARE EDUCATION/TRAINING PROGRAM

## 2025-05-15 PROCEDURE — 96372 THER/PROPH/DIAG INJ SC/IM: CPT | Performed by: STUDENT IN AN ORGANIZED HEALTH CARE EDUCATION/TRAINING PROGRAM

## 2025-05-15 PROCEDURE — 1159F MED LIST DOCD IN RCRD: CPT | Performed by: STUDENT IN AN ORGANIZED HEALTH CARE EDUCATION/TRAINING PROGRAM

## 2025-05-15 PROCEDURE — 1090F PRES/ABSN URINE INCON ASSESS: CPT | Performed by: STUDENT IN AN ORGANIZED HEALTH CARE EDUCATION/TRAINING PROGRAM

## 2025-05-15 PROCEDURE — 1036F TOBACCO NON-USER: CPT | Performed by: STUDENT IN AN ORGANIZED HEALTH CARE EDUCATION/TRAINING PROGRAM

## 2025-05-15 PROCEDURE — G8400 PT W/DXA NO RESULTS DOC: HCPCS | Performed by: STUDENT IN AN ORGANIZED HEALTH CARE EDUCATION/TRAINING PROGRAM

## 2025-05-15 PROCEDURE — 99214 OFFICE O/P EST MOD 30 MIN: CPT | Performed by: STUDENT IN AN ORGANIZED HEALTH CARE EDUCATION/TRAINING PROGRAM

## 2025-05-15 PROCEDURE — 3078F DIAST BP <80 MM HG: CPT | Performed by: STUDENT IN AN ORGANIZED HEALTH CARE EDUCATION/TRAINING PROGRAM

## 2025-05-15 PROCEDURE — 1123F ACP DISCUSS/DSCN MKR DOCD: CPT | Performed by: STUDENT IN AN ORGANIZED HEALTH CARE EDUCATION/TRAINING PROGRAM

## 2025-05-15 PROCEDURE — G8417 CALC BMI ABV UP PARAM F/U: HCPCS | Performed by: STUDENT IN AN ORGANIZED HEALTH CARE EDUCATION/TRAINING PROGRAM

## 2025-05-15 NOTE — PATIENT INSTRUCTIONS
Patient Instructions  Your next prolia will be in 6 months  Continue calcium and vitamin D daily  RTC in 6 weeks for prolia injection  Get labs one week prior to your next appointment

## (undated) DEVICE — 1315 FOAM STRIP NEEDLE COUNTER: Brand: DEVON

## (undated) DEVICE — MARKER,SKIN,WI/RULER AND LABELS: Brand: MEDLINE

## (undated) DEVICE — SKIN AFFIX SURG ADHESIVE 72/CS 0.55ML: Brand: MEDLINE

## (undated) DEVICE — NEEDLE HYPO 25GA L0.625IN BLU POLYPR HUB S STL REG BVL STR

## (undated) DEVICE — COUNTER NDL 60 COUNT FOAM STRP SGL MAG

## (undated) DEVICE — SUTURE MCRYL SZ 4-0 L27IN ABSRB UD L24MM PS-1 3/8 CIR PRIM Y935H

## (undated) DEVICE — GLOVE SURG SZ 65 L12IN FNGR THK87MIL WHT LTX FREE

## (undated) DEVICE — PAD,EYE,1-5/8X2 5/8,STERILE,LF,1/PK: Brand: MEDLINE

## (undated) DEVICE — GLOVE SURG SZ 6 THK91MIL LTX FREE SYN POLYISOPRENE ANTI

## (undated) DEVICE — GOWN,SIRUS,FABRNF,RAGLAN,L,ST,30/CS: Brand: MEDLINE

## (undated) DEVICE — NEEDLE ANGIO L1IN DIA21GA 1 THN WALL SMOOTH STD HUB

## (undated) DEVICE — PRESSURE MONITORING SET: Brand: TRUWAVE

## (undated) DEVICE — SET INFUS 25ML L117IN PMP MOD CK VLV RLER CLMP 2 SMRTSITE

## (undated) DEVICE — PROTECTOR EYE PT SELF ADH NS OPT GRD LF

## (undated) DEVICE — DRAPE,UTILITY,XL,4/PK,STERILE: Brand: MEDLINE

## (undated) DEVICE — LINER SUCT CANSTR 1500CC SEMI RIG W/ POR HYDROPHOBIC SHUT

## (undated) DEVICE — SUTURE PERMAHAND SZ 2-0 L17X18IN NONABSORBABLE BLK SILK SA65H

## (undated) DEVICE — PACK,BASIC,IX: Brand: MEDLINE

## (undated) DEVICE — CIRCUIT BREATHING SINGLE LIMB AD 72 IN 3 LT 2 FILTER LIMBO

## (undated) DEVICE — CONTAINER,SPECIMEN,OR STERILE,4OZ: Brand: MEDLINE

## (undated) DEVICE — SENSOR OXMTR SM AD DISP FOR INVOS SYS

## (undated) DEVICE — TOWEL,OR,DSP,ST,WHITE,DLX,XR,4/PK,20PK/C: Brand: MEDLINE

## (undated) DEVICE — SYRINGE BLB 50CC IRRIG PLIABLE FNGR FLNG GRAD FLSK DISP

## (undated) DEVICE — CATHETER ADAPTER: Brand: ADDTO

## (undated) DEVICE — CLIP INT SM WIDE RED TI TRNSVRS GRV CHEVRON SHP W/ PRECIS

## (undated) DEVICE — CLIP SM RED INTERN HMOCLP TITAN LIGATING

## (undated) DEVICE — TUBING, SUCTION, 3/16" X 10', STRAIGHT: Brand: MEDLINE

## (undated) DEVICE — VESSEL LOOPS,MAXI, RED: Brand: DEVON

## (undated) DEVICE — LINER,SEMI-RIGID,3000CC,50EA/CS: Brand: MEDLINE

## (undated) DEVICE — SUTURE VCRL SZ 4-0 L27IN ABSRB VLT L26MM SH 1/2 CIR J315H

## (undated) DEVICE — SUTURE PROL SZ 6-0 L18IN NONABSORBABLE BLU L13MM C-1 3/8 8718H

## (undated) DEVICE — STAPLER SKIN L39MM DIA0.53MM CRWN 5.7MM S STL FIX HD PROX

## (undated) DEVICE — ELECTRODE ES AD CRDLSS PT RET REM POLYHESIVE

## (undated) DEVICE — CORD ES L15FT PT RET REUSE VALLEYLAB REM

## (undated) DEVICE — PENCIL ES CRD L10FT HND SWCHING ROCK SWCH W/ EDGE COAT BLDE

## (undated) DEVICE — GAUZE,SPONGE,4"X4",16PLY,XRAY,STRL,LF: Brand: MEDLINE

## (undated) DEVICE — GLOVE ORANGE PI 7   MSG9070

## (undated) DEVICE — CHLORAPREP 26ML ORANGE

## (undated) DEVICE — DECANTER FLD 9IN ST BG FOR ASEP TRNSF OF FLD

## (undated) DEVICE — DRAPE THYROID

## (undated) DEVICE — STERILE COTTON BALLS LARGE 5/P: Brand: MEDLINE

## (undated) DEVICE — INTENDED FOR TISSUE SEPARATION, AND OTHER PROCEDURES THAT REQUIRE A SHARP SURGICAL BLADE TO PUNCTURE OR CUT.: Brand: BARD-PARKER ® STAINLESS STEEL BLADES

## (undated) DEVICE — Z INACTIVE USE 2535480 CLIP LIG M BLU TI HRT SHP WIRE HORZ 180 PER BX

## (undated) DEVICE — TOWEL,OR,DSP,ST,BLUE,STD,6/PK,12PK/CS: Brand: MEDLINE

## (undated) DEVICE — DRESSING TRNSPAR W5XL4.5IN FLM SHT SEMIPERMEABLE WIND

## (undated) DEVICE — SYRINGE MED 3ML CLR PLAS STD N CTRL LUERLOCK TIP DISP

## (undated) DEVICE — Device

## (undated) DEVICE — SYRINGE IRRIG 60ML SFT PLIABLE BLB EZ TO GRP 1 HND USE W/

## (undated) DEVICE — 260 CM J TIP WIRE .035

## (undated) DEVICE — ADAPTER,CATHETER/SYRINGE/LUER,STERILE: Brand: MEDLINE

## (undated) DEVICE — LOOP VES W25MM THK1MM MAXI RED SIL FLD REPELLENT 100 PER

## (undated) DEVICE — ANGIOGRAPHY KIT CUST MANIFOLD

## (undated) DEVICE — GLIDESHEATH SLENDER ACCESS KIT: Brand: GLIDESHEATH SLENDER

## (undated) DEVICE — SUTURE PROL SZ 6-0 L24IN NONABSORBABLE BLU L13MM C-1 3/8 8726H

## (undated) DEVICE — SET CATH 20GA L1.5IN RAD ART POLYUR RADPQ NDL INTEGR SPR

## (undated) DEVICE — DRAPE SHEET ULTRAGARD: Brand: MEDLINE

## (undated) DEVICE — ANESTHESIA CIRCUIT ADULT-LF: Brand: MEDLINE INDUSTRIES, INC.

## (undated) DEVICE — SUPPORT WRST AD W3.5XL9IN DIA14.5IN ART SFT ADJ HK AND LOOP

## (undated) DEVICE — BAND COMPR L24CM REG CLR PLAS HEMSTAT EXT HK AND LOOP RETEN

## (undated) DEVICE — RADIFOCUS OPTITORQUE ANGIOGRAPHIC CATHETER: Brand: OPTITORQUE